# Patient Record
Sex: FEMALE | Race: BLACK OR AFRICAN AMERICAN | NOT HISPANIC OR LATINO | Employment: OTHER | URBAN - METROPOLITAN AREA
[De-identification: names, ages, dates, MRNs, and addresses within clinical notes are randomized per-mention and may not be internally consistent; named-entity substitution may affect disease eponyms.]

---

## 2017-01-04 ENCOUNTER — OFFICE VISIT (OUTPATIENT)
Dept: NEUROLOGY | Facility: CLINIC | Age: 72
End: 2017-01-04
Payer: MEDICARE

## 2017-01-04 ENCOUNTER — INFUSION (OUTPATIENT)
Dept: INFUSION THERAPY | Facility: HOSPITAL | Age: 72
End: 2017-01-04
Attending: FAMILY MEDICINE
Payer: MEDICARE

## 2017-01-04 ENCOUNTER — TELEPHONE (OUTPATIENT)
Dept: NEUROLOGY | Facility: CLINIC | Age: 72
End: 2017-01-04

## 2017-01-04 VITALS
DIASTOLIC BLOOD PRESSURE: 78 MMHG | SYSTOLIC BLOOD PRESSURE: 117 MMHG | BODY MASS INDEX: 35.31 KG/M2 | WEIGHT: 180.75 LBS | HEART RATE: 78 BPM

## 2017-01-04 DIAGNOSIS — E03.9 HYPOTHYROID: Primary | ICD-10-CM

## 2017-01-04 DIAGNOSIS — R51.9 HEADACHE, UNSPECIFIED HEADACHE TYPE: ICD-10-CM

## 2017-01-04 DIAGNOSIS — M47.812 ARTHRITIS OF NECK: ICD-10-CM

## 2017-01-04 LAB
ANION GAP SERPL CALC-SCNC: 9 MMOL/L
BUN SERPL-MCNC: 8 MG/DL
CALCIUM SERPL-MCNC: 9.9 MG/DL
CHLORIDE SERPL-SCNC: 109 MMOL/L
CO2 SERPL-SCNC: 23 MMOL/L
CREAT SERPL-MCNC: 0.9 MG/DL
CRP SERPL-MCNC: 10.9 MG/L
ERYTHROCYTE [SEDIMENTATION RATE] IN BLOOD BY WESTERGREN METHOD: 39 MM/HR
EST. GFR  (AFRICAN AMERICAN): >60 ML/MIN/1.73 M^2
EST. GFR  (NON AFRICAN AMERICAN): >60 ML/MIN/1.73 M^2
GLUCOSE SERPL-MCNC: 162 MG/DL
POTASSIUM SERPL-SCNC: 4.1 MMOL/L
SODIUM SERPL-SCNC: 141 MMOL/L

## 2017-01-04 PROCEDURE — 99213 OFFICE O/P EST LOW 20 MIN: CPT | Mod: S$PBB,GC,, | Performed by: PSYCHIATRY & NEUROLOGY

## 2017-01-04 PROCEDURE — 80048 BASIC METABOLIC PNL TOTAL CA: CPT

## 2017-01-04 PROCEDURE — 25000003 PHARM REV CODE 250: Performed by: FAMILY MEDICINE

## 2017-01-04 PROCEDURE — 36591 DRAW BLOOD OFF VENOUS DEVICE: CPT

## 2017-01-04 PROCEDURE — 86140 C-REACTIVE PROTEIN: CPT

## 2017-01-04 PROCEDURE — 99999 PR PBB SHADOW E&M-EST. PATIENT-LVL IV: CPT | Mod: PBBFAC,GC,, | Performed by: PSYCHIATRY & NEUROLOGY

## 2017-01-04 PROCEDURE — 85651 RBC SED RATE NONAUTOMATED: CPT

## 2017-01-04 RX ORDER — HEPARIN 100 UNIT/ML
500 SYRINGE INTRAVENOUS
Status: COMPLETED | OUTPATIENT
Start: 2017-01-04 | End: 2017-01-04

## 2017-01-04 RX ORDER — SODIUM CHLORIDE 0.9 % (FLUSH) 0.9 %
10 SYRINGE (ML) INJECTION
Status: DISCONTINUED | OUTPATIENT
Start: 2017-01-04 | End: 2017-01-04 | Stop reason: HOSPADM

## 2017-01-04 RX ORDER — BACLOFEN 10 MG/1
10 TABLET ORAL 3 TIMES DAILY
Qty: 90 TABLET | Refills: 4 | Status: SHIPPED | OUTPATIENT
Start: 2017-01-04

## 2017-01-04 RX ADMIN — SODIUM CHLORIDE, PRESERVATIVE FREE 10 ML: 5 INJECTION INTRAVENOUS at 11:01

## 2017-01-04 RX ADMIN — HEPARIN 500 UNITS: 100 SYRINGE at 11:01

## 2017-01-04 NOTE — MR AVS SNAPSHOT
[facility]  [address] [address]  Phone: [phone]  Fax: [phone]                  Olga Smith   2017 8:30 AM   Office Visit    Description:  Female : 1945   Provider:  Ariel Tian II, MD   Department:  [facility] - Neurology           Diagnoses this Visit        Comments    Headache, unspecified headache type         Arthritis of neck                To Do List           Future Appointments        Provider Department Dept Phone    2017 11:00 AM CHAIR 03 [facility] [phone]    2017 8:00 AM Emilie Reynolds MD [facility] [phone]    2017 7:30 PM LAB, SLEEP [facility] [phone]    2017 1:00 PM Thang Marsh MD [facility] [phone]    2017 8:40 AM Bandar Valladares MD [facility] [phone]      Goals (5 Years of Data)              Today    16    Blood Pressure < 140/90   117/78  130/88  116/68       These Medications        Disp Refills Start End    baclofen (LIORESAL) 10 MG tablet 90 tablet 4 2017     Take 1 tablet (10 mg total) by mouth 3 (three) times daily. Take half (5mg)  tablet three times a day - Oral    [place] [facility] [place] [address] [address] Ph #: [phone]         [facility]     [facility] -  Assistance  Registered nurses in the [facility] provide clinical advisement, health education, appointment booking, and other advisory services.  Call for this free service at [phone].             Medications           Message regarding Medications     Verify the changes and/or additions to your medication regime listed below are the same as discussed with your clinician today.  If any of these changes or additions are incorrect, please notify your healthcare provider.             Verify that the  below list of medications is an accurate representation of the medications you are currently taking.  If none reported, the list may be blank. If incorrect, please contact your healthcare provider. Carry this list with you in case of emergency.           Current Medications     albuterol (PROAIR HFA) 90 mcg/actuation inhaler Inhale 2 puffs into the lungs every 6 (six) hours as needed for Wheezing.    alendronate (FOSAMAX) 70 MG tablet Take 1 tablet (70 mg total) by mouth every 7 days.    aspirin (ECOTRIN) 81 MG EC tablet Take 1 tablet (81 mg total) by mouth once daily.    atorvastatin (LIPITOR) 80 MG tablet Take 1 tablet (80 mg total) by mouth once daily.    baclofen (LIORESAL) 10 MG tablet Take 1 tablet (10 mg total) by mouth 3 (three) times daily. Take half (5mg)  tablet three times a day    bimatoprost (LUMIGAN) 0.01 % Drop Place 1 drop into both eyes every evening.    calcium carbonate-vitamin D3 (CALTRATE 600 + D) 600 mg (1,500 mg)-800 unit Chew Take 2 tablets by mouth 2 (two) times daily. Pt. Takes= 1.5 tabs every AM & 1 TAB Every evening.    colesevelam (WELCHOL) 625 mg tablet Take 3 tablets (1,875 mg total) by mouth every evening.    CORTEF 5 mg Tab     cyclobenzaprine (FLEXERIL) 5 MG tablet Take 1 tablet (5 mg total) by mouth 3 (three) times daily as needed for Muscle spasms.    dicyclomine (BENTYL) 10 MG capsule Take 1 capsule (10 mg total) by mouth 4 (four) times daily before meals and nightly.    diphenhydrAMINE (BENADRYL) 25 mg capsule Take 1 each (25 mg total) by mouth nightly as needed for Itching.    fentaNYL (DURAGESIC) 25 mcg/hr Place 1 patch onto the skin every 72 hours.    hydrocortisone (CORTEF) 10 MG Tab Take 1 tablet (10 mg total) by mouth 2 (two) times daily.    isosorbide mononitrate (IMDUR) 30 MG 24 hr tablet Take 1 tablet (30 mg total) by mouth once daily.    lactulose (CEPHULAC) 20 gram Pack Take 1 packet (20 g total) by mouth 3 (three) times daily.    lactulose (CHRONULAC) 10 gram/15 mL  solution     levothyroxine (SYNTHROID) 125 MCG tablet Take 1 tablet (125 mcg total) by mouth once daily.    lidocaine (XYLOCAINE) 5 % Oint ointment Apply topically as needed.    metoprolol tartrate (LOPRESSOR) 50 MG tablet Take 1 tablet (50 mg total) by mouth 2 (two) times daily.    montelukast (SINGULAIR) 10 mg tablet Take 10 mg by mouth every evening.    nitroGLYCERIN (NITROSTAT) 0.4 MG SL tablet Place 1 tablet (0.4 mg total) under the tongue every 5 (five) minutes as needed for Chest pain.    pantoprazole (PROTONIX) 40 MG tablet Take 1 tablet (40 mg total) by mouth 2 (two) times daily.    potassium chloride (KLOR-CON) 10 MEQ TbSR Take 1 tablet (10 mEq total) by mouth once daily.    prasugrel (EFFIENT) 10 mg Tab Take 1 tablet (10 mg total) by mouth once daily.    promethazine (PHENERGAN) 25 MG tablet Take 1 tablet (25 mg total) by mouth every 6 (six) hours as needed for Nausea.    psyllium husk, with sugar, (METAMUCIL, WITH SUGAR,) 3.4 gram/12 gram Powd Take 3.4 g by mouth 3 (three) times daily as needed. Take 1 TSP with 8 oz water, up to 3x / day. increase to 2 TSP to desired regularity    ranolazine (RANEXA) 1,000 mg Tb12 Take 1 tablet (1,000 mg total) by mouth 2 (two) times daily.    sertraline (ZOLOFT) 50 MG tablet Take 1 tablet (50 mg total) by mouth once daily.    verapamil (CALAN-SR) 180 MG CR tablet Take 1 tablet (180 mg total) by mouth every evening.    fluticasone-salmeterol 100-50 mcg/dose (ADVAIR DISKUS) 100-50 mcg/dose diskus inhaler Inhale 1 puff into the lungs 2 (two) times daily.           Clinical Reference Information           Vital Signs - Last Recorded  Most recent update: 1/4/2017  8:25 AM by Gardenia Lerma MA    BP Pulse Wt BMI       117/78 78 82 kg (180 lb 12.4 oz) 35.31 kg/m2       Blood Pressure          Most Recent Value    BP  117/78      Allergies as of 1/4/2017     Aspirin    Dilaudid [Hydromorphone (Bulk)]    Iodine And Iodide Containing Products    Morphine    Oxycodone     Penicillins    Percocet [Oxycodone-acetaminophen]    Shellfish Containing Products    Valium [Diazepam]    Vicodin [Hydrocodone-acetaminophen]    Codeine    Demerol [Meperidine]      Immunizations Administered on Date of Encounter - 1/4/2017     None      Orders Placed During Today's Visit     Future Labs/Procedures Expected by Expires    C-REACTIVE PROTEIN  1/4/2017 3/5/2018    Sedimentation rate, manual  1/4/2017 3/5/2018

## 2017-01-04 NOTE — PROGRESS NOTES
Patient Name: Olga Smith  MRN: [id number]    CC: Headache     Interval History: (1/4/2016)   Reported no change in HA. Took tramadol 3 doses last week and Fioricet twice last week. Denies any vision changes, dizziness, focal weakness, numbness, history of trauma, seizure or LOC.     Interval History: (11/9/2016)  Complaining HA 2-4 times per week. Occipital region and right side temporal, dull in character, stays 3-5 hours to all day, resolved after taking tramadol or after sleep. She feels nausea at times with the headache but has not vomited. She gets exacerbation of the pain with lights and loud noises. Denies any vision changes, dizziness, focal weakness, numbness, history of trauma, seizure or LOC. Zomig not working. Right now tramadol and fioricet is working little bit.           HPI: Olga Smith is a 71 y.o. 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 She presented to clinic for headache evaluation. She reported that she is suffering from headaches since 1985. Initially it was only in the occipital region and radiating to the right side of the head. She says it feels like someone has punched her in her head and then the pain feels like she is being repeatedly hit. Her headache is getting worse every day and today she reported the pain 6/10 and reported headache everyday and maximum reach to 10/10 more than once every day. Since she was last seen she says that she has been having headaches almost daily. The pain can last all day before it subsides. She feels nausea at times with the headache but has not vomited. She gets exacerbation of the pain with lights and loud noises. Reported some success with Zomig and other times she says that she has tried to take the medication but has felt that it did not help her at all. She has previously been tried on Botox and says that she tried another medication for daily preventative therapy but  does not remember the name. She does feel that the Botox helped somewhat but she has not noticed that it has helped enough for her to want to try the injections again. She says that she does not try to take any OTC preparations anymore because they have not helped her in the past. She has hoarseness of voice and reported that she was hospitalized and underwent tracheostomy complicated with vocal cord injury. Also complaining of mild dysphagia with solids. Denies any vision changes, dizziness, focal weakness, numbness, history of trauma, seizure or LOC.        Previous History:  From (11/5/2015)  She was recently admitted for chest pain that was was due to CAD with LAD stent placed on 10/26/15 by Dr. Valladares. She was consulted to Neurology in the hospital for an episode of AMS where she was not responding. CT head done showed post-surgical changes from craniopharyngioma resection in 2000. EEG showed slowing and focal slowing in the area of prior surgery. She says that she has been convalescing as expected after the procedure. She has been having headaches about once per week and says that they typically respond well to the nasal Zomig. She says that she has not noticed any improvement in her headaches with the use of Topamax. She has tried Elavil, Inderal, Verapamil, and Depakote in the past without any significant improvement in her symptoms. She is unwilling to retry any of these medications.       From (2/17/2016)  Since she was last seen she says that she has been having headaches almost daily. She has had some success with aborting the headaches with Zomig and other times she says that she has tried to take the medication but has felt that it did not help her at all. She says that she does not try to take any OTC preparations anymore because they have not helped her in the past. She says that has been having problems with IBS and has been having frequent diarrhea that has not been able to be treated. She has seen  her PCP and she has been seen in the ED for evaluation as well. She says that she has been given medications to try but has not been able to find any relief from the medication. She says that she has been unable to tolerate any food or liquids without having diarrhea. She has an appointment with a GI doctor but she says that the appointment has been rescheduled on several occasions. She has tried to get benzocaine topical as she says that she has rectal pain and fissures that makes going to the bathroom difficult.        ROS:    Constitutional: Negative for weight loss.   HENT: Negative for hearing loss.   Eyes: Negative for blurred vision and double vision.   Respiratory: Negative for shortness of breath and stridor.   Cardiovascular: Negative for chest pain and palpitations.   Gastrointestinal: Negative for nausea, vomiting and constipation.   Genitourinary: Negative for frequency. Negative for urgency.   Musculoskeletal: Negative for falls.   Skin: Negative for rash.   Neurological: Negative for dizziness and tremors. Negative for focal weakness and seizures.   Endo/Heme/Allergies: Does not bruise/bleed easily.   Psychiatric/Behavioral: Negative for depression and hallucinations. The patient is not nervous/anxious.      Past Medical History  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    Encounter for blood transfusion     Extravasation injury of IV catheter site with other complication 2008     Pain remains to left forearm, no IV sticks    Fibromyalgia     Glaucoma     Hypertension     Irritable bowel syndrome (IBS)     Osteoporosis 8/7/2015    Pacemaker     Thyroid disease     Tumor of optic nerve        Medications    Current Outpatient Prescriptions:     albuterol (PROAIR HFA) 90 mcg/actuation inhaler, Inhale 2 puffs into the lungs every 6 (six) hours as needed  for Wheezing., Disp: 4 Inhaler, Rfl: 5    alendronate (FOSAMAX) 70 MG tablet, Take 1 tablet (70 mg total) by mouth every 7 days., Disp: 12 tablet, Rfl: 3    aspirin (ECOTRIN) 81 MG EC tablet, Take 1 tablet (81 mg total) by mouth once daily., Disp: , Rfl: 0    atorvastatin (LIPITOR) 80 MG tablet, Take 1 tablet (80 mg total) by mouth once daily., Disp: 90 tablet, Rfl: 0    baclofen (LIORESAL) 10 MG tablet, Take 1 tablet (10 mg total) by mouth 3 (three) times daily. Take half (5mg)  tablet three times a day, Disp: 90 tablet, Rfl: 4    bimatoprost (LUMIGAN) 0.01 % Drop, Place 1 drop into both eyes every evening., Disp: 7.5 mL, Rfl: 2    calcium carbonate-vitamin D3 (CALTRATE 600 + D) 600 mg (1,500 mg)-800 unit Chew, Take 2 tablets by mouth 2 (two) times daily. Pt. Takes= 1.5 tabs every AM & 1 TAB Every evening., Disp: , Rfl:     colesevelam (WELCHOL) 625 mg tablet, Take 3 tablets (1,875 mg total) by mouth every evening., Disp: 270 tablet, Rfl: 3    CORTEF 5 mg Tab, , Disp: , Rfl:     cyclobenzaprine (FLEXERIL) 5 MG tablet, Take 1 tablet (5 mg total) by mouth 3 (three) times daily as needed for Muscle spasms., Disp: 90 tablet, Rfl: 1    dicyclomine (BENTYL) 10 MG capsule, Take 1 capsule (10 mg total) by mouth 4 (four) times daily before meals and nightly., Disp: 120 capsule, Rfl: 3    diphenhydrAMINE (BENADRYL) 25 mg capsule, Take 1 each (25 mg total) by mouth nightly as needed for Itching., Disp: 30 capsule, Rfl: 5    fentaNYL (DURAGESIC) 25 mcg/hr, Place 1 patch onto the skin every 72 hours. (Patient taking differently: Place 1 patch onto the skin every 72 hours. As Needed.), Disp: 5 patch, Rfl: 0    hydrocortisone (CORTEF) 10 MG Tab, Take 1 tablet (10 mg total) by mouth 2 (two) times daily., Disp: 60 tablet, Rfl: 6    isosorbide mononitrate (IMDUR) 30 MG 24 hr tablet, Take 1 tablet (30 mg total) by mouth once daily., Disp: 30 tablet, Rfl: 11    lactulose (CEPHULAC) 20 gram Pack, Take 1 packet (20 g  total) by mouth 3 (three) times daily., Disp: 30 packet, Rfl: 0    lactulose (CHRONULAC) 10 gram/15 mL solution, , Disp: , Rfl:     levothyroxine (SYNTHROID) 125 MCG tablet, Take 1 tablet (125 mcg total) by mouth once daily., Disp: 90 tablet, Rfl: 0    lidocaine (XYLOCAINE) 5 % Oint ointment, Apply topically as needed., Disp: 50 g, Rfl: 1    metoprolol tartrate (LOPRESSOR) 50 MG tablet, Take 1 tablet (50 mg total) by mouth 2 (two) times daily., Disp: 60 tablet, Rfl: 11    montelukast (SINGULAIR) 10 mg tablet, Take 10 mg by mouth every evening., Disp: , Rfl:     nitroGLYCERIN (NITROSTAT) 0.4 MG SL tablet, Place 1 tablet (0.4 mg total) under the tongue every 5 (five) minutes as needed for Chest pain., Disp: 10 tablet, Rfl: 0    pantoprazole (PROTONIX) 40 MG tablet, Take 1 tablet (40 mg total) by mouth 2 (two) times daily., Disp: 30 tablet, Rfl: 11    potassium chloride (KLOR-CON) 10 MEQ TbSR, Take 1 tablet (10 mEq total) by mouth once daily., Disp: 30 tablet, Rfl: 5    prasugrel (EFFIENT) 10 mg Tab, Take 1 tablet (10 mg total) by mouth once daily., Disp: 90 tablet, Rfl: 3    promethazine (PHENERGAN) 25 MG tablet, Take 1 tablet (25 mg total) by mouth every 6 (six) hours as needed for Nausea., Disp: 30 tablet, Rfl: 1    psyllium husk, with sugar, (METAMUCIL, WITH SUGAR,) 3.4 gram/12 gram Powd, Take 3.4 g by mouth 3 (three) times daily as needed. Take 1 TSP with 8 oz water, up to 3x / day. increase to 2 TSP to desired regularity, Disp: 862 g, Rfl: 3    ranolazine (RANEXA) 1,000 mg Tb12, Take 1 tablet (1,000 mg total) by mouth 2 (two) times daily., Disp: 60 tablet, Rfl: 11    sertraline (ZOLOFT) 50 MG tablet, Take 1 tablet (50 mg total) by mouth once daily., Disp: 30 tablet, Rfl: 5    verapamil (CALAN-SR) 180 MG CR tablet, Take 1 tablet (180 mg total) by mouth every evening., Disp: 30 tablet, Rfl: 0    fluticasone-salmeterol 100-50 mcg/dose (ADVAIR DISKUS) 100-50 mcg/dose diskus inhaler, Inhale 1 puff into  the lungs 2 (two) times daily. (Patient taking differently: Inhale 1 puff into the lungs 2 (two) times daily. prn), Disp: 60 each, Rfl: 3    Allergies  Review of patient's allergies indicates:   Allergen Reactions    Aspirin Nausea Only    Dilaudid [hydromorphone (bulk)] Itching     Rash , swelling of throat.    Iodine and iodide containing products Other (See Comments)     Closing of Throat. No Shellfish of any kind. Can't eat certain kinds of Regular Fish, ie, can't eat: Tuna, Swordfish, Tar Heel=} these are the major fishes she can't eat.    Morphine Itching, Nausea And Vomiting and Hallucinations    Oxycodone Itching and Swelling     Swelling of Throat.    Penicillins Itching and Rash    Percocet [oxycodone-acetaminophen] Itching and Swelling     Cannot tolerate this medication. Swelling of Throat.    Shellfish containing products Other (See Comments)     Closing of Throat. No Shellfish of any kind. Can't eat certain kinds of Regular Fish, ie, can't eat: Tuna, Swordfish, Tar Heel=} these are the major fishes she can't eat.      Valium [diazepam] Hives and Itching    Vicodin [hydrocodone-acetaminophen] Itching, Nausea Only and Rash    Codeine Itching, Nausea Only and Rash    Demerol [meperidine] Itching and Rash       Social History  Social History     Social History    Marital status:      Spouse name: N/A    Number of children: N/A    Years of education: N/A     Occupational History    Not on file.     Social History Main Topics    Smoking status: Former Smoker     Packs/day: 1.00     Years: 25.00     Types: Cigarettes     Quit date: 11/9/1985    Smokeless tobacco: Never Used    Alcohol use No    Drug use: No    Sexual activity: Yes     Partners: Male     Other Topics Concern    Not on file     Social History Narrative    No narrative on file       Family History  Family History   Problem Relation Age of Onset    Cataracts Brother     Glaucoma Brother     Glaucoma Mother     Lung  cancer Mother     Coronary artery disease Mother     Coronary artery disease Father     Cataracts Sister     Glaucoma Sister     No Known Problems Maternal Aunt     No Known Problems Maternal Uncle     No Known Problems Paternal Aunt     No Known Problems Paternal Uncle     No Known Problems Maternal Grandmother     No Known Problems Maternal Grandfather     No Known Problems Paternal Grandmother     No Known Problems Paternal Grandfather     Colon cancer Neg Hx     Colon polyps Neg Hx     Amblyopia Neg Hx     Blindness Neg Hx     Cancer Neg Hx     Diabetes Neg Hx     Hypertension Neg Hx     Macular degeneration Neg Hx     Retinal detachment Neg Hx     Strabismus Neg Hx     Stroke Neg Hx     Thyroid disease Neg Hx        Physical Exam  Visit Vitals    /78    Pulse 78    Wt 82 kg (180 lb 12.4 oz)    BMI 35.31 kg/m2       General appearance: Well-developed, well-groomed.     Neurologic Exam: The patient is awake, alert and oriented. Language is fluent. Recent and remote memory are normal. Attention span and concentration are normal. Fund of knowledge is appropriate.     Cranial nerves: pupils are round and reactive to light and accommodation. Visual fields are full to confrontation. Fundoscopic exam reveals sharp discs bilaterally, with good venous pulsations. Ocular motility is full in all cardinal positions of gaze. Facial sensation is normal to pinprick and light touch. Corneal reflexes are present bilaterally. Facial activation is symmetric. Hearing is normal bilaterally. Palate elevates symmetrically and gag reflex is intact bilaterally. Shoulder elevation is symmetric and full strength bilaterally. Tongue is midline and neck rotation strength is normal bilaterally. Neck range of motion is normal.     Motor examination of all extremities demonstrates normal bulk and tone in all four limbs. There are no atrophy or fasciculations. Strength is 5/5 in the upper and lower extremities  bilaterally without pronator drift.     Sensory examination is normal to pinprick, vibration and proprioception in the upper and lower extremities bilaterally. Romberg is negative.    Deep tendon reflexes are 2+ and symmetric in the upper and lower extremities bilaterally. Toes are mute bilaterally.     Gait: Normal gait.    Coordination: Finger to nose and heel to shin testing is normal in both upper and lower extremities. Rapid alternating movements are normal in both upper and lower extremities.     General exam  Cardiovascular: regular rate and rhythm with no murmurs, rubs or gallops. There are no carotid or vertebral artery bruits. Pulses in both upper and lower extremities are symmetric. There is no peripheral edema.   Head and neck: no cervical lymphadenopathy. Right temporal and occipital region is tender more than left side. Tender areas in neck B/L       Lab and Test Results    WBC   Date Value Ref Range Status   11/11/2016 12.35 3.90 - 12.70 K/uL Final   11/10/2016 13.84 (H) 3.90 - 12.70 K/uL Final   11/01/2016 9.63 3.90 - 12.70 K/uL Final     Hemoglobin   Date Value Ref Range Status   11/11/2016 9.7 (L) 12.0 - 16.0 g/dL Final   11/10/2016 10.2 (L) 12.0 - 16.0 g/dL Final   11/01/2016 11.6 (L) 12.0 - 16.0 g/dL Final     Hematocrit   Date Value Ref Range Status   11/11/2016 31.1 (L) 37.0 - 48.5 % Final   11/10/2016 32.3 (L) 37.0 - 48.5 % Final   11/01/2016 36.6 (L) 37.0 - 48.5 % Final     Platelets   Date Value Ref Range Status   11/11/2016 332 150 - 350 K/uL Final   11/10/2016 342 150 - 350 K/uL Final   11/01/2016 364 (H) 150 - 350 K/uL Final     Glucose   Date Value Ref Range Status   12/06/2016 103 70 - 110 mg/dL Final   11/10/2016 117 (H) 70 - 110 mg/dL Final   11/09/2016 106 70 - 110 mg/dL Final     Sodium   Date Value Ref Range Status   12/06/2016 142 136 - 145 mmol/L Final   11/10/2016 144 136 - 145 mmol/L Final   11/09/2016 143 136 - 145 mmol/L Final     Potassium   Date Value Ref Range Status    12/06/2016 3.9 3.5 - 5.1 mmol/L Final   11/10/2016 4.3 3.5 - 5.1 mmol/L Final   11/09/2016 4.0 3.5 - 5.1 mmol/L Final     Chloride   Date Value Ref Range Status   12/06/2016 109 95 - 110 mmol/L Final   11/10/2016 110 95 - 110 mmol/L Final   11/09/2016 109 95 - 110 mmol/L Final     CO2   Date Value Ref Range Status   12/06/2016 25 23 - 29 mmol/L Final   11/10/2016 25 23 - 29 mmol/L Final   11/09/2016 27 23 - 29 mmol/L Final     BUN, Bld   Date Value Ref Range Status   12/06/2016 13 8 - 23 mg/dL Final   11/10/2016 9 8 - 23 mg/dL Final   11/09/2016 9 8 - 23 mg/dL Final     Creatinine   Date Value Ref Range Status   12/06/2016 0.9 0.5 - 1.4 mg/dL Final   11/10/2016 0.8 0.5 - 1.4 mg/dL Final   11/09/2016 0.8 0.5 - 1.4 mg/dL Final     Calcium   Date Value Ref Range Status   12/06/2016 9.7 8.7 - 10.5 mg/dL Final   11/10/2016 10.0 8.7 - 10.5 mg/dL Final   11/09/2016 9.7 8.7 - 10.5 mg/dL Final     Magnesium   Date Value Ref Range Status   08/27/2016 2.0 1.6 - 2.6 mg/dL Final   04/18/2016 1.8 1.6 - 2.6 mg/dL Final   04/08/2016 1.8 1.6 - 2.6 mg/dL Final     Phosphorus   Date Value Ref Range Status   10/26/2015 3.4 2.7 - 4.5 mg/dL Final     Alkaline Phosphatase   Date Value Ref Range Status   11/10/2016 72 55 - 135 U/L Final   11/01/2016 92 55 - 135 U/L Final   10/18/2016 74 55 - 135 U/L Final     ALT   Date Value Ref Range Status   11/10/2016 10 10 - 44 U/L Final   11/01/2016 10 10 - 44 U/L Final   10/18/2016 9 (L) 10 - 44 U/L Final     AST   Date Value Ref Range Status   11/10/2016 10 10 - 40 U/L Final   11/01/2016 14 10 - 40 U/L Final   10/18/2016 16 10 - 40 U/L Final       Images:     CT Brain: August:  1. No evidence of acute intracranial hemorrhage.  2. Stable appearing remote postoperative change as detailed above.    Medication tried:  -- Nasal Zomig  -- Topamax   -- Elavil  -- Inderal  -- Verapamil  -- Depakote   -- Botox   -- Imitrex   -- Flexeril   -- Gabapentin     Assessment     Olga Smith is a 71 y.o.  female with past medical history of Craniopharyngioma, fibromyalgia and CAD S/P stenting presented for headache.   -- HA is less likely due to Craniopharyngioma considering small size and no compression   -- Likely ocipital neuralgia and arthritis of neck.     Plan:     -- Continue on Zoloft 50 mg daily   -- She was not taking baclofen 5 mg TID because of some pharmacy issue. She will start taking from today. Prescription handed over to her.   -- Avoid over the counter medication to decrease the risk of medication overuse headache  -- Follow ESR and CRP   -- Case discussed with Dr Yates   -- Follow up on May 24th at 10:30 am.       Jaylan George MD  Neurology Resident   [facility] [address] [place] [address]  Pager: [phone]

## 2017-01-04 NOTE — TELEPHONE ENCOUNTER
----- Message from Parris Acosta sent at 1/4/2017 12:05 PM CST -----  Contact: [facility]  Pharmacy has questions about the script they received for Baclofen 10mg    Contact number [phone]  Thanks

## 2017-01-06 ENCOUNTER — TELEPHONE (OUTPATIENT)
Dept: ENDOSCOPY | Facility: HOSPITAL | Age: 72
End: 2017-01-06

## 2017-01-06 NOTE — TELEPHONE ENCOUNTER
Patient has an order for an EGD.  Is it ok to hold Effient 5 days prior to procedure?  Please advise.  Thank you.  Mary Anne

## 2017-01-06 NOTE — TELEPHONE ENCOUNTER
Patient called to schedule EGD..  While reviewing PMH and medications, was noted that patient is on Effient. She is prescribed Effient from her Cardiologist-Dr JESSICA Valladares.  Informed patient that we need approval to hold Effient from physician prior to scheduling procedure. After receiving information back, I will call to schedule procedure.  Stated understanding. No further questions at this time.

## 2017-01-09 ENCOUNTER — HOSPITAL ENCOUNTER (OUTPATIENT)
Dept: SLEEP MEDICINE | Facility: HOSPITAL | Age: 72
Discharge: HOME | End: 2017-01-09
Attending: FAMILY MEDICINE
Payer: MEDICARE

## 2017-01-09 ENCOUNTER — INITIAL CONSULT (OUTPATIENT)
Dept: OPHTHALMOLOGY | Facility: CLINIC | Age: 72
End: 2017-01-09
Payer: MEDICARE

## 2017-01-09 DIAGNOSIS — D44.4 CRANIOPHARYNGIOMA: ICD-10-CM

## 2017-01-09 DIAGNOSIS — H40.1134 PRIMARY OPEN ANGLE GLAUCOMA OF BOTH EYES, INDETERMINATE STAGE: Primary | ICD-10-CM

## 2017-01-09 DIAGNOSIS — H53.40 VISUAL FIELD LOSS: ICD-10-CM

## 2017-01-09 DIAGNOSIS — H25.13 NUCLEAR SCLEROTIC CATARACT OF BOTH EYES: ICD-10-CM

## 2017-01-09 DIAGNOSIS — H25.13 SENILE NUCLEAR SCLEROSIS, BILATERAL: ICD-10-CM

## 2017-01-09 DIAGNOSIS — G47.33 OSA (OBSTRUCTIVE SLEEP APNEA): ICD-10-CM

## 2017-01-09 PROCEDURE — 92014 COMPRE OPH EXAM EST PT 1/>: CPT | Mod: S$PBB,,, | Performed by: OPHTHALMOLOGY

## 2017-01-09 PROCEDURE — 99999 PR PBB SHADOW E&M-EST. PATIENT-LVL II: CPT | Mod: PBBFAC,,, | Performed by: OPHTHALMOLOGY

## 2017-01-09 PROCEDURE — 95811 POLYSOM 6/>YRS CPAP 4/> PARM: CPT | Mod: 26,,, | Performed by: INTERNAL MEDICINE

## 2017-01-09 PROCEDURE — 92250 FUNDUS PHOTOGRAPHY W/I&R: CPT | Mod: 26,S$PBB,, | Performed by: OPHTHALMOLOGY

## 2017-01-09 PROCEDURE — 95811 PR POLYSOMNOGRAPHY W/CPAP: ICD-10-PCS | Mod: 26,,, | Performed by: INTERNAL MEDICINE

## 2017-01-09 NOTE — PROGRESS NOTES
HPI     Glaucoma    Additional comments: glaucoma eval           Comments   Pt here for glaucoma evaluation per Dr. Gil. Pt has hx of advanced glaucoma   OD>OS and is inconsistent with drop therapy per notes. Pt has been using   drops in both eyes consistently, states that she was started on drops in   2007 and was on and off of them from 1555-9753. She notes trouble with her   preipheral vision of late which she attributes to a brain tumor   (craniopharyngioma) that was found in 2000 ([place]).    1. POAG OD>OS  2. NS OU    MEDS:  Lumigan QHS OU       Last edited by Cat Solis MD on 1/9/2017  9:16 AM. (History)            Assessment /Plan     For exam results, see Encounter Report.    Primary open angle glaucoma of both eyes, indeterminate stage    Nuclear sclerotic cataract of both eyes    Craniopharyngioma    Visual field loss    Senile nuclear sclerosis, bilateral      NO PHOTO FILE - all in OIS and EPIC   - glaucoma suspect - but suspect visual field loss 2/2 brain tumor and NOT glaucoma   -referred over from [facility] by         Glaucoma (type and duration)    POAG OD>OS // ??  LTG    First HVF  12/23/2016   First photos   1/8/2017- pending   Treatment / Drops started 2007 - but has not used regularly            Family history    None        Glaucoma meds    Latanoprost QHS OU          H/O adverse rxn to glaucoma drops    None        LASERS    None        GLAUCOMA SURGERIES    None        OTHER EYE SURGERIES    None        CDR    0.7 thin sup / 0.7         Tbase    15-16/15-16          Tmax    16/16            Ttarget    ??              HVF    2 test 2015 to  2016 - superior and inferior arcuate defect od // nonspecific defects os   ?? IF VF LOSS 2/2 CRANIOPHARYNGIOMA - INOPERABLE         Gonio    +3 OU        CCT    497/507        OCT    1 test 2016 to 2016 - RNFL - 44 (diffuse thinning) od // Nasal and temporal borderline os        HRT  1 test 2015 to 2017 - MR 0.191- od // 0.167 os         Disc photos   1/9/ 2017    - Ttoday    13/11  -T today - photos / gonio - initial consult       2 craniopharyngioma    -in-operable    ++ VF loss - may likely be 2/2 tumor and not the glaucoma     3. NS ou     4. Chronic pain / headaches    ?? 2/2 to #2     PLAN   csm   IOP low   VF loss likely 2/2 tumor and not glaucomatous - healthy looking rims - but severe VF loss     F/U 4 months - try HVF 24-2 stim V od and 24-2 ss os // OCT     I have seen and personally examined the patient.  I agree with the findings, assessment and plan of the resident and/or fellow.     Emilie Reynolds MD

## 2017-01-09 NOTE — LETTER
January 9, 2017      Deb Gil, OD  [address]  [facility] [address]           [facility]  [address]  [facility] [address]  Phone: [phone]  Fax: [phone]          Patient: Olga Smith   MR Number: [id number]   YOB: 1945   Date of Visit: 1/9/2017       Dear Dr. Deb Gil:    Thank you for referring Olga Smith to me for evaluation. Attached you will find relevant portions of my assessment and plan of care.    If you have questions, please do not hesitate to call me. I look forward to following Olga Smith along with you.    Sincerely,    Emilie Reynolds MD    Enclosure  CC:  No Recipients    If you would like to receive this communication electronically, please contact [email or web address] or [phone] to request more information on [employer] Link access.    For providers and/or their staff who would like to refer a patient to [facility], please contact us through our one-stop-shop provider referral line, [facility], at [phone].    If you feel you have received this communication in error or would no longer like to receive these types of communications, please e-mail [email or web address]

## 2017-01-12 ENCOUNTER — OFFICE VISIT (OUTPATIENT)
Dept: GASTROENTEROLOGY | Facility: CLINIC | Age: 72
End: 2017-01-12
Payer: MEDICARE

## 2017-01-12 VITALS
BODY MASS INDEX: 35.37 KG/M2 | WEIGHT: 180.13 LBS | HEIGHT: 60 IN | SYSTOLIC BLOOD PRESSURE: 106 MMHG | DIASTOLIC BLOOD PRESSURE: 66 MMHG | HEART RATE: 62 BPM

## 2017-01-12 DIAGNOSIS — K21.9 GASTROESOPHAGEAL REFLUX DISEASE, ESOPHAGITIS PRESENCE NOT SPECIFIED: ICD-10-CM

## 2017-01-12 DIAGNOSIS — Z90.49 HISTORY OF PARTIAL COLECTOMY: Chronic | ICD-10-CM

## 2017-01-12 DIAGNOSIS — K58.2 IRRITABLE BOWEL SYNDROME WITH BOTH CONSTIPATION AND DIARRHEA: Primary | ICD-10-CM

## 2017-01-12 PROCEDURE — 99213 OFFICE O/P EST LOW 20 MIN: CPT | Mod: S$PBB,GC,, | Performed by: INTERNAL MEDICINE

## 2017-01-12 PROCEDURE — 99999 PR PBB SHADOW E&M-EST. PATIENT-LVL III: CPT | Mod: PBBFAC,GC,, | Performed by: INTERNAL MEDICINE

## 2017-01-12 PROCEDURE — 99213 OFFICE O/P EST LOW 20 MIN: CPT | Mod: PBBFAC | Performed by: INTERNAL MEDICINE

## 2017-01-12 NOTE — MR AVS SNAPSHOT
[facility]enter[facility]  [address]  [place] [address]  Phone: [phone]  Fax: [phone]                  Olga Smith   2017 1:00 PM   Office Visit    Description:  Female : 1945   Provider:  Thang Marsh MD   Department:  [facility] - Gastroenterology           Reason for Visit     Follow-up           Diagnoses this Visit        Comments    Irritable bowel syndrome with both constipation and diarrhea    -  Primary            To Do List           Future Appointments        Provider Department Dept Phone    2017 10:30 AM CHAIR 01 [facility] [phone]    2017 8:40 AM Bandar Valladares MD [facility] - Cardiology [phone]    2017 10:15 AM PERIMETRY, [facility] [phone]    2017 10:45 AM Emilie Reynolds MD [facility] Ophthalmology [phone]      Your Future Surgeries/Procedures     2017   Surgery with Emanuel Ozuna MD   [facility]    [address] [facility] [address] [phone]              Goals (5 Years of Data)              Today    17    Blood Pressure < 140/90   106/66  117/78  130/88      [facility]     [facility] Care Line - / Assistance  Registered nurses in the [facility] provide clinical advisement, health education, appointment booking, and other advisory services.  Call for this free service at [phone].             Medications           Message regarding Medications     Verify the changes and/or additions to your medication regime listed below are the same as discussed with your clinician today.  If any of these changes or additions are incorrect, please notify your healthcare provider.             Verify that the below list of medications is an accurate representation of the medications you are currently taking.  If none reported, the list may be  blank. If incorrect, please contact your healthcare provider. Carry this list with you in case of emergency.           Current Medications     albuterol (PROAIR HFA) 90 mcg/actuation inhaler Inhale 2 puffs into the lungs every 6 (six) hours as needed for Wheezing.    alendronate (FOSAMAX) 70 MG tablet Take 1 tablet (70 mg total) by mouth every 7 days.    aspirin (ECOTRIN) 81 MG EC tablet Take 1 tablet (81 mg total) by mouth once daily.    atorvastatin (LIPITOR) 80 MG tablet Take 1 tablet (80 mg total) by mouth once daily.    baclofen (LIORESAL) 10 MG tablet Take 1 tablet (10 mg total) by mouth 3 (three) times daily. Take half (5mg)  tablet three times a day    bimatoprost (LUMIGAN) 0.01 % Drop Place 1 drop into both eyes every evening.    calcium carbonate-vitamin D3 (CALTRATE 600 + D) 600 mg (1,500 mg)-800 unit Chew Take 2 tablets by mouth 2 (two) times daily. Pt. Takes= 1.5 tabs every AM & 1 TAB Every evening.    colesevelam (WELCHOL) 625 mg tablet Take 3 tablets (1,875 mg total) by mouth every evening.    cyclobenzaprine (FLEXERIL) 5 MG tablet Take 1 tablet (5 mg total) by mouth 3 (three) times daily as needed for Muscle spasms.    dicyclomine (BENTYL) 10 MG capsule Take 1 capsule (10 mg total) by mouth 4 (four) times daily before meals and nightly.    diphenhydrAMINE (BENADRYL) 25 mg capsule Take 1 each (25 mg total) by mouth nightly as needed for Itching.    fentaNYL (DURAGESIC) 25 mcg/hr Place 1 patch onto the skin every 72 hours.    hydrocortisone (CORTEF) 10 MG Tab Take 1 tablet (10 mg total) by mouth 2 (two) times daily.    isosorbide mononitrate (IMDUR) 30 MG 24 hr tablet Take 1 tablet (30 mg total) by mouth once daily.    levothyroxine (SYNTHROID) 125 MCG tablet Take 1 tablet (125 mcg total) by mouth once daily.    lidocaine (XYLOCAINE) 5 % Oint ointment Apply topically as needed.    metoprolol tartrate (LOPRESSOR) 50 MG tablet Take 1 tablet (50 mg total) by mouth 2 (two) times daily.    montelukast  (SINGULAIR) 10 mg tablet Take 10 mg by mouth every evening.    pantoprazole (PROTONIX) 40 MG tablet Take 1 tablet (40 mg total) by mouth 2 (two) times daily.    potassium chloride (KLOR-CON) 10 MEQ TbSR Take 1 tablet (10 mEq total) by mouth once daily.    prasugrel (EFFIENT) 10 mg Tab Take 1 tablet (10 mg total) by mouth once daily.    promethazine (PHENERGAN) 25 MG tablet Take 1 tablet (25 mg total) by mouth every 6 (six) hours as needed for Nausea.    ranolazine (RANEXA) 1,000 mg Tb12 Take 1 tablet (1,000 mg total) by mouth 2 (two) times daily.    sertraline (ZOLOFT) 50 MG tablet Take 1 tablet (50 mg total) by mouth once daily.    verapamil (CALAN-SR) 180 MG CR tablet Take 1 tablet (180 mg total) by mouth every evening.    lactulose (CHRONULAC) 10 gram/15 mL solution Take 20 g by mouth 3 (three) times daily.     nitroGLYCERIN (NITROSTAT) 0.4 MG SL tablet Place 1 tablet (0.4 mg total) under the tongue every 5 (five) minutes as needed for Chest pain.    psyllium husk, with sugar, (METAMUCIL, WITH SUGAR,) 3.4 gram/12 gram Powd Take 3.4 g by mouth 3 (three) times daily as needed. Take 1 TSP with 8 oz water, up to 3x / day. increase to 2 TSP to desired regularity           Clinical Reference Information           Vital Signs - Last Recorded  Most recent update: 1/12/2017  1:19 PM by Dakota Wallace MA    BP Pulse Ht Wt BMI    106/66 62 5' (1.524 m) 81.7 kg (180 lb 1.9 oz) 35.18 kg/m2      Blood Pressure          Most Recent Value    BP  106/66      Allergies as of 1/12/2017     Aspirin    Dilaudid [Hydromorphone (Bulk)]    Iodine And Iodide Containing Products    Morphine    Oxycodone    Penicillins    Percocet [Oxycodone-acetaminophen]    Shellfish Containing Products    Valium [Diazepam]    Vicodin [Hydrocodone-acetaminophen]    Codeine    Demerol [Meperidine]      Immunizations Administered on Date of Encounter - 1/12/2017     None      Orders Placed During Today's Visit     Future Labs/Procedures Expected by  Expires    Clostridium difficile EIA  1/12/2017 3/13/2018    Fecal fat, qualitative  1/12/2017 3/13/2018    Giardia / Cryptosporidum, EIA  1/12/2017 3/13/2018

## 2017-01-12 NOTE — PROGRESS NOTES
Gastroenterology Clinic    Reason for visit: The primary encounter diagnosis was Irritable bowel syndrome with both constipation and diarrhea. Diagnoses of History of partial colectomy and Gastroesophageal reflux disease, esophagitis presence not specified were also pertinent to this visit.  Referring provider/PCP: Valencia Palencia MD    HPI:  Olga Smith is a 71 y.o. female seen in follow up. Hx of prior ischemic colitis s/p partial colectomy, s/p cherise, GERD, prior dx of IBS-D.  She was seen by myself approx 3 months ago and by Dr. Ozuna 10 months ago.  Today she states her diarrhea has somewhat improved, now approx 3x /day. She rarely has nocturnal stool, but does get up to go urinate and sometimes has stool with it. She is concerned now that over the past week her stool is looked oily. She states she woonders if it is infected given the gray appearance. No systemic symptoms of F/C, rash, emesis, fatigue.   She had episode approx 1 month ago when she got severe constipation and was 'locked up'. Visited her PCP and was given lactulose and castor oil.  Difficult situation as patient seems to be very sensitive and alternates bowel habits.   She still taking welchol 3 tabs nightly , taking bentyl with some relief. Also taking ducosate PRN.   Does not like fiber as 'makes her go' too much.   She does not drink carbonated beverages. No artificial sweeters. Drinks soy milk, although does not avoid dairy entirely. Does eat cheeses.        Note per prior visit: 11/3/16  Olga Smith is a 71 y.o. female hx of ischemic colitis s/p partial colectomy, s/p cherise, s/p hysterectomy, GERD, IBS -D who presents as clinic follow up for continued abd pain and diarrhea. Pt is new to me. Has been seen by [facility] Carrillo at [facility] prior.  Pt has had abdominal issues 'all my life'. She has alternating diarrhea with constipation. She recently has had frequent diarrhea, described as immediate defecation after eating. No  steatorrhea. Having nocturnal stools, approx 2 - 3 x /night. Having stools approx 6 x /day on bad days. Has constipation after taking immodium or lomotil. Has tried questran. Was previously prescribed rifaximin but was not able to obtain this medication. Has been on fiber in past with good benefit ,but stopped this. Takes bentyl with decent relief of pain and some improvement in diarrhea.           Past Endo Hx:  Colon 9/2016:  Impression:          - Non-bleeding internal hemorrhoids.                       - Two 2 to 3 mm polyps in the sigmoid colon.                        Resected and retrieved.                       - Mild diverticulosis.                       - A few ulcers at the colonic anastomosis.                       - Biopsies were taken with a cold forceps from the                        entire colon for evaluation of microscopic colitis.  Recommendation:      - Await pathology results.                       - Repeat colonoscopy in 5 years for surveillance                        based on pathology results.                       - Return to GI clinic in 1 month.     PATH  1. COLON BIOPSY, SIGMOID- GRANULATION TISSUE SUGGESTIVE OF THE BASE OF AN ULCER AND  REACTIVE GLANDULAR HYPERPLASIA (HYPERPLASTIC POLYP).  2. COLON BIOPSIES, RANDOM NO SIGNIFICANT HISTOPATHOLOGIC ABNORMALITY. THERE IS NO  EVIDENCE OF MICROSCOPIC COLITIS.     EGD 2014:  No report available      Review of Systems:  Constitutional: no fever, chills or change in weight   Respiratory: no cough or shortness of breath   Cardiovascular: no chest pain or palpitations   Gastrointestinal: as per HPI  Hematologic/Lymphatic: no easy bruising or lymphadenopathy   Musculoskeletal: no arthralgias or myalgias   Neurological: no change in mental status  Behavioral/Psych: no change in mood    Current Outpatient Prescriptions on File Prior to Visit   Medication Sig Dispense Refill    albuterol (PROAIR HFA) 90 mcg/actuation inhaler Inhale 2 puffs into the  lungs every 6 (six) hours as needed for Wheezing. 4 Inhaler 5    alendronate (FOSAMAX) 70 MG tablet Take 1 tablet (70 mg total) by mouth every 7 days. 12 tablet 3    aspirin (ECOTRIN) 81 MG EC tablet Take 1 tablet (81 mg total) by mouth once daily.  0    atorvastatin (LIPITOR) 80 MG tablet Take 1 tablet (80 mg total) by mouth once daily. 90 tablet 0    baclofen (LIORESAL) 10 MG tablet Take 1 tablet (10 mg total) by mouth 3 (three) times daily. Take half (5mg)  tablet three times a day 90 tablet 4    bimatoprost (LUMIGAN) 0.01 % Drop Place 1 drop into both eyes every evening. 7.5 mL 2    calcium carbonate-vitamin D3 (CALTRATE 600 + D) 600 mg (1,500 mg)-800 unit Chew Take 2 tablets by mouth 2 (two) times daily. Pt. Takes= 1.5 tabs every AM & 1 TAB Every evening.      colesevelam (WELCHOL) 625 mg tablet Take 3 tablets (1,875 mg total) by mouth every evening. 270 tablet 3    cyclobenzaprine (FLEXERIL) 5 MG tablet Take 1 tablet (5 mg total) by mouth 3 (three) times daily as needed for Muscle spasms. 90 tablet 1    dicyclomine (BENTYL) 10 MG capsule Take 1 capsule (10 mg total) by mouth 4 (four) times daily before meals and nightly. 120 capsule 3    diphenhydrAMINE (BENADRYL) 25 mg capsule Take 1 each (25 mg total) by mouth nightly as needed for Itching. 30 capsule 5    fentaNYL (DURAGESIC) 25 mcg/hr Place 1 patch onto the skin every 72 hours. (Patient taking differently: Place 1 patch onto the skin every 72 hours. As Needed.) 5 patch 0    hydrocortisone (CORTEF) 10 MG Tab Take 1 tablet (10 mg total) by mouth 2 (two) times daily. 60 tablet 6    isosorbide mononitrate (IMDUR) 30 MG 24 hr tablet Take 1 tablet (30 mg total) by mouth once daily. 30 tablet 11    levothyroxine (SYNTHROID) 125 MCG tablet Take 1 tablet (125 mcg total) by mouth once daily. 90 tablet 0    lidocaine (XYLOCAINE) 5 % Oint ointment Apply topically as needed. 50 g 1    metoprolol tartrate (LOPRESSOR) 50 MG tablet Take 1 tablet (50 mg  total) by mouth 2 (two) times daily. 60 tablet 11    montelukast (SINGULAIR) 10 mg tablet Take 10 mg by mouth every evening.      pantoprazole (PROTONIX) 40 MG tablet Take 1 tablet (40 mg total) by mouth 2 (two) times daily. 30 tablet 11    potassium chloride (KLOR-CON) 10 MEQ TbSR Take 1 tablet (10 mEq total) by mouth once daily. 30 tablet 5    prasugrel (EFFIENT) 10 mg Tab Take 1 tablet (10 mg total) by mouth once daily. 90 tablet 3    promethazine (PHENERGAN) 25 MG tablet Take 1 tablet (25 mg total) by mouth every 6 (six) hours as needed for Nausea. 30 tablet 1    ranolazine (RANEXA) 1,000 mg Tb12 Take 1 tablet (1,000 mg total) by mouth 2 (two) times daily. 60 tablet 11    sertraline (ZOLOFT) 50 MG tablet Take 1 tablet (50 mg total) by mouth once daily. 30 tablet 5    verapamil (CALAN-SR) 180 MG CR tablet Take 1 tablet (180 mg total) by mouth every evening. 30 tablet 0    lactulose (CHRONULAC) 10 gram/15 mL solution Take 20 g by mouth 3 (three) times daily.       nitroGLYCERIN (NITROSTAT) 0.4 MG SL tablet Place 1 tablet (0.4 mg total) under the tongue every 5 (five) minutes as needed for Chest pain. 10 tablet 0    psyllium husk, with sugar, (METAMUCIL, WITH SUGAR,) 3.4 gram/12 gram Powd Take 3.4 g by mouth 3 (three) times daily as needed. Take 1 TSP with 8 oz water, up to 3x / day. increase to 2 TSP to desired regularity 862 g 3     No current facility-administered medications on file prior to visit.        Review of patient's allergies indicates:   Allergen Reactions    Aspirin Nausea Only    Dilaudid [hydromorphone (bulk)] Itching     Rash , swelling of throat.    Iodine and iodide containing products Other (See Comments)     Closing of Throat. No Shellfish of any kind. Can't eat certain kinds of Regular Fish, ie, can't eat: Tuna, Swordfish, [place]=} these are the major fishes she can't eat.    Morphine Itching, Nausea And Vomiting and Hallucinations    Oxycodone Itching and Swelling      Swelling of Throat.    Penicillins Itching and Rash    Percocet [oxycodone-acetaminophen] Itching and Swelling     Cannot tolerate this medication. Swelling of Throat.    Shellfish containing products Other (See Comments)     Closing of Throat. No Shellfish of any kind. Can't eat certain kinds of Regular Fish, ie, can't eat: Tuna, Swordfish, [place]=} these are the major fishes she can't eat.      Valium [diazepam] Hives and Itching    Vicodin [hydrocodone-acetaminophen] Itching, Nausea Only and Rash    Codeine Itching, Nausea Only and Rash    Demerol [meperidine] Itching and Rash       Physical Exam:  Visit Vitals    /66    Pulse 62    Ht 5' (1.524 m)    Wt 81.7 kg (180 lb 1.9 oz)    BMI 35.18 kg/m2       Gen: pleasant and in NAD  HEENT: sclera non-icteric  Chest: non-labored breathing  CV: RRR ; pulses intact  Abd: soft, NT , ND ; hyperactive BS  Ext: no LE edema   Skin: no rashes,  or lesions   Pscyh: appropriate mood and affect  Neuro: alert, oriented ; no focal deficits noted      Laboratory:  Lab Results   Component Value Date    WBC 12.35 11/11/2016    HGB 9.7 (L) 11/11/2016    HCT 31.1 (L) 11/11/2016    MCV 92 11/11/2016     11/11/2016     CMP  Sodium   Date Value Ref Range Status   01/04/2017 141 136 - 145 mmol/L Final     Potassium   Date Value Ref Range Status   01/04/2017 4.1 3.5 - 5.1 mmol/L Final     Chloride   Date Value Ref Range Status   01/04/2017 109 95 - 110 mmol/L Final     CO2   Date Value Ref Range Status   01/04/2017 23 23 - 29 mmol/L Final     Glucose   Date Value Ref Range Status   01/04/2017 162 (H) 70 - 110 mg/dL Final     BUN, Bld   Date Value Ref Range Status   01/04/2017 8 8 - 23 mg/dL Final     Creatinine   Date Value Ref Range Status   01/04/2017 0.9 0.5 - 1.4 mg/dL Final     Calcium   Date Value Ref Range Status   01/04/2017 9.9 8.7 - 10.5 mg/dL Final     Total Protein   Date Value Ref Range Status   11/10/2016 6.2 6.0 - 8.4 g/dL Final     Albumin   Date  Value Ref Range Status   11/10/2016 3.3 (L) 3.5 - 5.2 g/dL Final     Total Bilirubin   Date Value Ref Range Status   11/10/2016 0.2 0.1 - 1.0 mg/dL Final     Comment:     For infants and newborns, interpretation of results should be based  on gestational age, weight and in agreement with clinical  observations.  Premature Infant recommended reference ranges:  Up to 24 hours.............<8.0 mg/dL  Up to 48 hours............<12.0 mg/dL  3-5 days..................<15.0 mg/dL  6-29 days.................<15.0 mg/dL       Alkaline Phosphatase   Date Value Ref Range Status   11/10/2016 72 55 - 135 U/L Final     AST   Date Value Ref Range Status   11/10/2016 10 10 - 40 U/L Final     ALT   Date Value Ref Range Status   11/10/2016 10 10 - 44 U/L Final     Anion Gap   Date Value Ref Range Status   2017 9 8 - 16 mmol/L Final     eGFR if    Date Value Ref Range Status   2017 >60 >60 mL/min/1.73 m^2 Final     eGFR if non    Date Value Ref Range Status   2017 >60 >60 mL/min/1.73 m^2 Final     Comment:     Calculation used to obtain the estimated glomerular filtration  rate (eGFR) is the CKD-EPI equation. Since race is unknown   in our information system, the eGFR values for   -American and Non--American patients are given   for each creatinine result.         Imagin2016 CT IV contrast  Lung bases are clear.  No pericardial or pleural effusion.  The liver, pancreas, spleen, adrenal glands, and kidneys are unremarkable.  Status post cholecystectomy.  Dilated common bile duct measuring 1.4 cm likely related to prior instrumentation.  No hydronephrosis.  Status post hysterectomy.  No adnexal masses.  Urinary bladder is unremarkable.  GI tract is nonobstructed.  There are postsurgical changes in the descending colon, likely prior partial colectomy.  Sigmoid colon diverticulosis noted.  Note made of prominent diverticulum proximal to the suture line which  demonstrates inspissated barium.  No inflammatory changes.  No evidence for small bowel obstruction.  The appendix is not seen.  Probable duodenal diverticulum noted.  There is no retroperitoneal lymphadenopathy.  There is no ascites.  Abdominal aorta is normal caliber, noting mild calcified plaque.  Regional osseous structures demonstrate no aggressive appearing lytic or blastic lesions.  Degenerative changes noted in the lower lumbar spine.        Assessment:  Olga Smith is a 71 y.o. female who returns for follow up of diarrhea mixed with constipation.   Difficult situation with alternating bowel movements.   Did not respond well to trial of metamucil fiber as caused diarrhea.  Still taking welchol and bentyl.     Suspect IBS- M      Plan:  Give trial of Align probiotics today   - advised to take x 1 month and then notify us and let us know if any changes   - could also consider IBgard, avoidance of dairy (or lactaid)   - further testing to consider: EUS, fecal elastase,   Check stool studies given recent hospitalization and concern for 'oily' stool   - check Cdiff, giardia, and fecal fat  Advised continue docusate daily unless worsening diarrhea  Continue welchol, advised to decrease if having worsening constipation  Scheduled for EGD + small bowel biospies given diarrhea / GERD     RTC 12 weeks      Thang Marsh MD  Gastroenterology Fellow (PGY-[id number])  Pager: [phone]      Orders Placed This Encounter   Procedures    Clostridium difficile EIA    Giardia / Cryptosporidum, EIA    Fecal fat, qualitative

## 2017-01-13 ENCOUNTER — TELEPHONE (OUTPATIENT)
Dept: GASTROENTEROLOGY | Facility: CLINIC | Age: 72
End: 2017-01-13

## 2017-01-16 ENCOUNTER — LAB VISIT (OUTPATIENT)
Dept: LAB | Facility: HOSPITAL | Age: 72
End: 2017-01-16
Attending: INTERNAL MEDICINE
Payer: MEDICARE

## 2017-01-16 DIAGNOSIS — K58.2 IRRITABLE BOWEL SYNDROME WITH BOTH CONSTIPATION AND DIARRHEA: ICD-10-CM

## 2017-01-16 LAB
C DIFF GDH STL QL: NEGATIVE
C DIFF TOX A+B STL QL IA: NEGATIVE

## 2017-01-16 PROCEDURE — 87329 GIARDIA AG IA: CPT

## 2017-01-16 PROCEDURE — 89125 SPECIMEN FAT STAIN: CPT

## 2017-01-16 PROCEDURE — 87449 NOS EACH ORGANISM AG IA: CPT

## 2017-01-17 ENCOUNTER — HOSPITAL ENCOUNTER (EMERGENCY)
Facility: HOSPITAL | Age: 72
Discharge: HOME | End: 2017-01-17
Attending: EMERGENCY MEDICINE
Payer: MEDICARE

## 2017-01-17 ENCOUNTER — TELEPHONE (OUTPATIENT)
Dept: GASTROENTEROLOGY | Facility: CLINIC | Age: 72
End: 2017-01-17

## 2017-01-17 VITALS
HEIGHT: 60 IN | OXYGEN SATURATION: 100 % | WEIGHT: 176 LBS | RESPIRATION RATE: 18 BRPM | BODY MASS INDEX: 34.55 KG/M2 | DIASTOLIC BLOOD PRESSURE: 62 MMHG | TEMPERATURE: 98 F | HEART RATE: 66 BPM | SYSTOLIC BLOOD PRESSURE: 126 MMHG

## 2017-01-17 DIAGNOSIS — J44.1 COPD EXACERBATION: Primary | ICD-10-CM

## 2017-01-17 DIAGNOSIS — J06.9 UPPER RESPIRATORY TRACT INFECTION, UNSPECIFIED TYPE: ICD-10-CM

## 2017-01-17 DIAGNOSIS — R06.02 SHORTNESS OF BREATH: ICD-10-CM

## 2017-01-17 LAB
ALBUMIN SERPL BCP-MCNC: 3.5 G/DL
ALP SERPL-CCNC: 81 U/L
ALT SERPL W/O P-5'-P-CCNC: 7 U/L
ANION GAP SERPL CALC-SCNC: 7 MMOL/L
AST SERPL-CCNC: 12 U/L
BASOPHILS # BLD AUTO: 0.01 K/UL
BASOPHILS NFR BLD: 0.1 %
BILIRUB SERPL-MCNC: 0.3 MG/DL
BUN SERPL-MCNC: 14 MG/DL
CALCIUM SERPL-MCNC: 9.7 MG/DL
CHLORIDE SERPL-SCNC: 108 MMOL/L
CO2 SERPL-SCNC: 24 MMOL/L
CREAT SERPL-MCNC: 0.9 MG/DL
DIFFERENTIAL METHOD: ABNORMAL
EOSINOPHIL # BLD AUTO: 0.6 K/UL
EOSINOPHIL NFR BLD: 5.7 %
ERYTHROCYTE [DISTWIDTH] IN BLOOD BY AUTOMATED COUNT: 15.1 %
EST. GFR  (AFRICAN AMERICAN): >60 ML/MIN/1.73 M^2
EST. GFR  (NON AFRICAN AMERICAN): >60 ML/MIN/1.73 M^2
FAT STL SUDAN IV STN: NORMAL
GLUCOSE SERPL-MCNC: 94 MG/DL
HCT VFR BLD AUTO: 33.9 %
HGB BLD-MCNC: 10.6 G/DL
LYMPHOCYTES # BLD AUTO: 2.6 K/UL
LYMPHOCYTES NFR BLD: 24.4 %
MCH RBC QN AUTO: 28.7 PG
MCHC RBC AUTO-ENTMCNC: 31.3 %
MCV RBC AUTO: 92 FL
MONOCYTES # BLD AUTO: 1.2 K/UL
MONOCYTES NFR BLD: 11 %
NEUTROPHILS # BLD AUTO: 6.4 K/UL
NEUTROPHILS NFR BLD: 58.8 %
PLATELET # BLD AUTO: 329 K/UL
PMV BLD AUTO: 9.8 FL
POTASSIUM SERPL-SCNC: 4.4 MMOL/L
PROT SERPL-MCNC: 6.5 G/DL
RBC # BLD AUTO: 3.69 M/UL
SODIUM SERPL-SCNC: 139 MMOL/L
WBC # BLD AUTO: 10.79 K/UL

## 2017-01-17 PROCEDURE — 94640 AIRWAY INHALATION TREATMENT: CPT

## 2017-01-17 PROCEDURE — 85025 COMPLETE CBC W/AUTO DIFF WBC: CPT

## 2017-01-17 PROCEDURE — 96374 THER/PROPH/DIAG INJ IV PUSH: CPT

## 2017-01-17 PROCEDURE — 99284 EMERGENCY DEPT VISIT MOD MDM: CPT | Mod: 25

## 2017-01-17 PROCEDURE — 25000003 PHARM REV CODE 250: Performed by: EMERGENCY MEDICINE

## 2017-01-17 PROCEDURE — 63600175 PHARM REV CODE 636 W HCPCS: Performed by: EMERGENCY MEDICINE

## 2017-01-17 PROCEDURE — 25000242 PHARM REV CODE 250 ALT 637 W/ HCPCS: Performed by: EMERGENCY MEDICINE

## 2017-01-17 PROCEDURE — 80053 COMPREHEN METABOLIC PANEL: CPT

## 2017-01-17 PROCEDURE — 96361 HYDRATE IV INFUSION ADD-ON: CPT

## 2017-01-17 PROCEDURE — 96375 TX/PRO/DX INJ NEW DRUG ADDON: CPT

## 2017-01-17 RX ORDER — RANOLAZINE 500 MG/1
TABLET, FILM COATED, EXTENDED RELEASE ORAL
COMMUNITY
Start: 2017-01-13

## 2017-01-17 RX ORDER — PROMETHAZINE HYDROCHLORIDE 25 MG/1
25 TABLET ORAL EVERY 6 HOURS PRN
Qty: 30 TABLET | Refills: 1 | Status: SHIPPED | OUTPATIENT
Start: 2017-01-17 | End: 2017-03-26

## 2017-01-17 RX ORDER — PREDNISONE 50 MG/1
50 TABLET ORAL DAILY
Qty: 4 TABLET | Refills: 0 | Status: SHIPPED | OUTPATIENT
Start: 2017-01-17 | End: 2017-01-21

## 2017-01-17 RX ORDER — METHYLPREDNISOLONE SODIUM SUCCINATE 125 MG/2ML
125 INJECTION INTRAMUSCULAR; INTRAVENOUS
Status: COMPLETED | OUTPATIENT
Start: 2017-01-17 | End: 2017-01-17

## 2017-01-17 RX ORDER — IPRATROPIUM BROMIDE AND ALBUTEROL SULFATE 2.5; .5 MG/3ML; MG/3ML
3 SOLUTION RESPIRATORY (INHALATION)
Status: COMPLETED | OUTPATIENT
Start: 2017-01-17 | End: 2017-01-17

## 2017-01-17 RX ORDER — DOXYCYCLINE 100 MG/1
100 CAPSULE ORAL 2 TIMES DAILY
Qty: 20 CAPSULE | Refills: 0 | Status: SHIPPED | OUTPATIENT
Start: 2017-01-17 | End: 2017-01-27

## 2017-01-17 RX ORDER — HEPARIN 100 UNIT/ML
500 SYRINGE INTRAVENOUS
Status: COMPLETED | OUTPATIENT
Start: 2017-01-17 | End: 2017-01-17

## 2017-01-17 RX ADMIN — METHYLPREDNISOLONE SODIUM SUCCINATE 125 MG: 125 INJECTION, POWDER, FOR SOLUTION INTRAMUSCULAR; INTRAVENOUS at 09:01

## 2017-01-17 RX ADMIN — IPRATROPIUM BROMIDE AND ALBUTEROL SULFATE 3 ML: .5; 3 SOLUTION RESPIRATORY (INHALATION) at 09:01

## 2017-01-17 RX ADMIN — HEPARIN 500 UNITS: 100 SYRINGE at 11:01

## 2017-01-17 NOTE — TELEPHONE ENCOUNTER
Patient states that she has already been advised that she has no stomach infection.  Patient states that she is at the pharmacy counter and needs the prescriptions received from the ED today but she left the prescriptions in her car.  Promethazine refill forwarded for review.

## 2017-01-17 NOTE — TELEPHONE ENCOUNTER
Prescriptions for alendronate and promethazine called in to geoff Yuan, @ [facility] pharmacy.  Informed pharmacist that the patient states that the other prescriptions she requested were in her car and were provided by an ED physician today.  Informed that this nurse did not feel comfortable providing those prescriptions as that provider was not in this office and the patient did have the prescriptions available to her.  Pharmacist verbalized understanding.

## 2017-01-17 NOTE — ED TRIAGE NOTES
Sob with cough x several days getting worse yesterday into today.   Also had a fall around [place] time.  Complaints of pain to the right ankle.

## 2017-01-17 NOTE — ED PROVIDER NOTES
"Encounter Date: 1/17/2017    SCRIBE #1 NOTE: I, Victor Hugo Isbell, am scribing for, and in the presence of,  Matt Burks MD. I have scribed the following portions of the note - Other sections scribed: HPI/ROS.       History     Chief Complaint   Patient presents with    Shortness of Breath     since yesterday afternoon w dry cough     Review of patient's allergies indicates:   Allergen Reactions    Aspirin Nausea Only    Dilaudid [hydromorphone (bulk)] Itching     Rash , swelling of throat.    Iodine and iodide containing products Other (See Comments)     Closing of Throat. No Shellfish of any kind. Can't eat certain kinds of Regular Fish, ie, can't eat: Tuna, Swordfish, [place]=} these are the major fishes she can't eat.    Morphine Itching, Nausea And Vomiting and Hallucinations    Oxycodone Itching and Swelling     Swelling of Throat.    Penicillins Itching and Rash    Percocet [oxycodone-acetaminophen] Itching and Swelling     Cannot tolerate this medication. Swelling of Throat.    Shellfish containing products Other (See Comments)     Closing of Throat. No Shellfish of any kind. Can't eat certain kinds of Regular Fish, ie, can't eat: Tuna, Swordfish, [place]=} these are the major fishes she can't eat.      Valium [diazepam] Hives and Itching    Vicodin [hydrocodone-acetaminophen] Itching, Nausea Only and Rash    Codeine Itching, Nausea Only and Rash    Demerol [meperidine] Itching and Rash     HPI Comments: CC: SOB    HPI: This 72 y.o. Female with IBS, COPD, asthma, HTN, Fibromyalgia, Coronary artery disease, pacemaker, thyroid disease, and craniopharyngioma presents to the ED c/o SOB. There's  Associated chills, a dry cough, sneezing, wheezing, rhinorrhea, voice change, general body aches. Symptoms worsened today since onset "a couple days ago". Symptoms are acute and severe. There's no attempted treatment despite having an albuterol inhaler. The pt denies chest pain, leg pain, vomiting, or " diarrhea.      The history is provided by the patient. No  was used.     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    Encounter for blood transfusion     Extravasation injury of IV catheter site with other complication 2008     Pain remains to left forearm, no IV sticks    Fibromyalgia     Glaucoma     Hypertension     Irritable bowel syndrome (IBS)     Osteoporosis 8/7/2015    Pacemaker     Thyroid disease     Tumor of optic nerve      Past Medical History Pertinent Negatives   Diagnosis Date Noted    Amblyopia 11/4/2015    Cataract 11/4/2015    Diabetes mellitus 11/29/2016    Diabetic retinopathy 11/4/2015    Macular degeneration 11/4/2015    Retinal detachment 11/4/2015    Sickle cell anemia 11/4/2015    Sickle cell trait 11/4/2015    Strabismus 11/4/2015    Transfusion reaction 10/7/2016    Uveitis 11/4/2015     Past Surgical History   Procedure Laterality Date    Hysterectomy      Abdominal surgery      Hernia repair      Cardiac surgery       stents and pacemaker    Small intestine surgery      Colonoscopy N/A 9/19/2016     Procedure: COLONOSCOPY;  Surgeon: Lisandro Kaba MD;  Location: [facility];  Service: Endoscopy;  Laterality: N/A;  OUT PATIENT [facility] GI/CAN'T DO PROCEDURE AT [facility], HAS TO COME HERE    Tracheostomy tube placement       pt kept x 8.5 years. Removed during 2009     Family History   Problem Relation Age of Onset    Cataracts Brother     Glaucoma Brother     Glaucoma Mother     Lung cancer Mother     Coronary artery disease Mother     Coronary artery disease Father     Cataracts Sister     Glaucoma Sister     No Known Problems Maternal Aunt     No Known Problems Maternal Uncle     No Known Problems Paternal Aunt     No Known Problems Paternal Uncle     No Known Problems Maternal Grandmother      No Known Problems Maternal Grandfather     No Known Problems Paternal Grandmother     No Known Problems Paternal Grandfather     Colon cancer Neg Hx     Colon polyps Neg Hx     Amblyopia Neg Hx     Blindness Neg Hx     Cancer Neg Hx     Diabetes Neg Hx     Hypertension Neg Hx     Macular degeneration Neg Hx     Retinal detachment Neg Hx     Strabismus Neg Hx     Stroke Neg Hx     Thyroid disease Neg Hx      Social History   Substance Use Topics    Smoking status: Former Smoker     Packs/day: 1.00     Years: 25.00     Types: Cigarettes     Quit date: 11/9/1985    Smokeless tobacco: Never Used    Alcohol use No     Review of Systems   Constitutional: Positive for chills. Negative for fever.   HENT: Positive for rhinorrhea, sneezing and voice change. Negative for ear pain and sore throat.    Eyes: Negative for visual disturbance.   Respiratory: Positive for cough, shortness of breath and wheezing.    Cardiovascular: Negative for chest pain.   Gastrointestinal: Negative for abdominal pain, constipation, diarrhea, nausea and vomiting.   Genitourinary: Negative for difficulty urinating and dysuria.   Musculoskeletal: Positive for myalgias. Negative for back pain.   Skin: Negative for rash.   Neurological: Negative for dizziness, weakness, light-headedness and headaches.       Physical Exam   Initial Vitals   BP Pulse Resp Temp SpO2   01/17/17 0910 01/17/17 0910 01/17/17 0910 01/17/17 0910 01/17/17 0910   109/71 82 18 98.1 °F (36.7 °C) 98 %     Physical Exam    Nursing note and vitals reviewed.  Constitutional: She appears well-developed and well-nourished. She is not diaphoretic. No distress.   HENT:   Head: Normocephalic and atraumatic.   Right Ear: External ear normal.   Left Ear: External ear normal.   Nose: Nose normal.   Mouth/Throat: Oropharynx is clear and moist.   Eyes: Conjunctivae and EOM are normal. Pupils are equal, round, and reactive to light. Right eye exhibits no discharge. Left eye  exhibits no discharge. No scleral icterus.   Neck: Normal range of motion. Neck supple.   Cardiovascular: Normal rate, regular rhythm, normal heart sounds and intact distal pulses.   No murmur heard.  Pulmonary/Chest: She is in respiratory distress. She has wheezes. She has no rhonchi. She has no rales.   There are scattered inspiratory and expiratory wheezes noted on examination.  Lung sounds are symmetric bilaterally.  There are no rales or rhonchi.  The patient is in mild respiratory distress.   Abdominal: Soft. Bowel sounds are normal. She exhibits no distension and no mass. There is no tenderness. There is no rebound and no guarding.   Musculoskeletal: Normal range of motion. She exhibits no edema or tenderness.   Neurological: She is alert and oriented to person, place, and time. She has normal strength. No cranial nerve deficit or sensory deficit.   Skin: Skin is warm and dry. No rash noted. No erythema. No pallor.   Psychiatric: She has a normal mood and affect. Her behavior is normal. Judgment and thought content normal.         ED Course   Procedures  Labs Reviewed   CBC W/ AUTO DIFFERENTIAL - Abnormal; Notable for the following:        Result Value    RBC 3.69 (*)     Hemoglobin 10.6 (*)     Hematocrit 33.9 (*)     MCHC 31.3 (*)     RDW 15.1 (*)     Mono # 1.2 (*)     Eos # 0.6 (*)     All other components within normal limits   COMPREHENSIVE METABOLIC PANEL - Abnormal; Notable for the following:     ALT 7 (*)     Anion Gap 7 (*)     All other components within normal limits     EKG Readings: (Independently Interpreted)   Initial Reading: No STEMI.   EKG reviewed and interpreted by me shows sinus rhythm and rate is 68.  MO interval is short.  QRS and QT intervals are within normal limits.  There is normal axis.  Is good R-wave progression.  There are Q waves in the inferior leads as well as T-wave inversions in the anterior leads similar morphology to EKG from November 11, 2016.       X-Rays:    Independently Interpreted Readings:   Other Readings:  Chest x-ray reviewed and interpreted by me shows no evidence of pulmonary edema, pneumothorax, or consolidations/ infiltrates.    Medical Decision Making:   ED Management:  This is the emergent evaluation of a 72-year-old female presents the emergency department complaining of shortness of breath and cough for 2 days.  Also complains of nasal congestion.Differential diagnosis at the time of initial evaluation included, but was not limited to: COPD exacerbation, viral illness, pneumonia, pneumothorax.  I considered but doubt coronary ischemia and acute pulmonary embolus.    Chest x-ray clear of infiltrate or consolidation.  The patient's laboratory evaluation shows a baseline normocytic anemia.  EKG reveals baseline ischemic changes with no other acute findings.  Patient likely has a viral URI with associated COPD exacerbation.  She was given DuoNeb treatments and Solu-Medrol and IV fluids with good effect.  I will continue her on prednisone as well as every 4 hours as needed albuterol inhaler.  She will receive antibiotics in the setting of COPD exacerbation for chronic colonization.  She has been advised to follow-up with her PCP in the next 2 days and return for new or worsening symptoms such as worsening shortness of breath or chest pain.            Scribe Attestation:   Scribe #1: I performed the above scribed service and the documentation accurately describes the services I performed. I attest to the accuracy of the note.    Attending Attestation:           Physician Attestation for Scribe:  Physician Attestation Statement for Scribe #1: I, Matt Burks MD, reviewed documentation, as scribed by Victor Hugo Isbell in my presence, and it is both accurate and complete.                 ED Course     Clinical Impression:   The primary encounter diagnosis was COPD exacerbation. Diagnoses of Shortness of breath and Upper respiratory tract infection, unspecified type  were also pertinent to this visit.          Matt Burks Jr., MD  01/17/17 6419

## 2017-01-17 NOTE — TELEPHONE ENCOUNTER
----- Message from Otto De Souza MD sent at 1/16/2017  2:19 PM CST -----  Stool test negative for C.diff.

## 2017-01-17 NOTE — ED AVS SNAPSHOT
[facility]  [place] [address]               Olga Smith   2017  9:12 AM   ED    Description:  Female : 1945   Department:  [facility]           Your Care was Coordinated By:     Provider Role From To    Matt Burks Jr., MD Attending Provider 17 0914 --      Reason for Visit     Shortness of Breath           Diagnoses this Visit        Comments    COPD exacerbation    -  Primary     Shortness of breath         Upper respiratory tract infection, unspecified type           ED Disposition     None           To Do List           Follow-up Information     Follow up with Valencia Palencia MD. Schedule an appointment as soon as possible for a visit in 2 days.    Specialty:  Family Medicine    Why:  Please follow-up with your PCP in the next 2 days for further evaluation and management.  Use inhaler every 4 hours as needed.  Medications as prescribed.  Return for new or worsening symptoms such as fever or shortness of breath or chest pain.    Contact information:    [address] [place] [address] [phone]         These Medications        Disp Refills Start End    doxycycline (VIBRAMYCIN) 100 MG Cap 20 capsule 0 2017    Take 1 capsule (100 mg total) by mouth 2 (two) times daily. - Oral    [facility] Ph #: [phone]       predniSONE (DELTASONE) 50 MG Tab 4 tablet 0 2017    Take 1 tablet (50 mg total) by mouth once daily. - Oral    [facility] [address] Ph #: [phone]         [facility]     [facility] Care [facility] -  Assistance  Registered nurses in the [facility] provide clinical advisement, health education, appointment booking, and other advisory services.  Call for this free service at [phone].              Medications           Message regarding Medications     Verify the changes and/or additions to your medication regime listed below are the same as discussed with your clinician today.  If any of these changes or additions are incorrect, please notify your healthcare provider.        START taking these NEW medications        Refills    doxycycline (VIBRAMYCIN) 100 MG Cap 0    Sig: Take 1 capsule (100 mg total) by mouth 2 (two) times daily.    Class: Print    Route: Oral    predniSONE (DELTASONE) 50 MG Tab 0    Sig: Take 1 tablet (50 mg total) by mouth once daily.    Class: Print    Route: Oral      These medications were administered today        Dose Freq    methylPREDNISolone sodium succinate injection 125 mg 125 mg ED 1 Time    Sig: Inject 125 mg into the vein ED 1 Time.    Class: Normal    Route: Intravenous    sodium chloride 0.9% bolus 500 mL 500 mL ED 1 Time    Sig: Inject 500 mLs into the vein ED 1 Time.    Class: Normal    Route: Intravenous    albuterol-ipratropium 2.5mg-0.5mg/3mL nebulizer solution 3 mL 3 mL Every 5 min    Sig: Take 3 mLs by nebulization every 5 (five) minutes.    Class: Normal    Route: Nebulization           Verify that the below list of medications is an accurate representation of the medications you are currently taking.  If none reported, the list may be blank. If incorrect, please contact your healthcare provider. Carry this list with you in case of emergency.           Current Medications     albuterol (PROAIR HFA) 90 mcg/actuation inhaler Inhale 2 puffs into the lungs every 6 (six) hours as needed for Wheezing.    alendronate (FOSAMAX) 70 MG tablet Take 1 tablet (70 mg total) by mouth every 7 days.    aspirin (ECOTRIN) 81 MG EC tablet Take 1 tablet (81 mg total) by mouth once daily.    atorvastatin (LIPITOR) 80 MG tablet Take 1 tablet (80 mg total) by mouth once daily.    baclofen (LIORESAL) 10 MG tablet Take 1 tablet (10 mg total) by mouth 3 (three) times daily. Take half  (5mg)  tablet three times a day    bimatoprost (LUMIGAN) 0.01 % Drop Place 1 drop into both eyes every evening.    calcium carbonate-vitamin D3 (CALTRATE 600 + D) 600 mg (1,500 mg)-800 unit Chew Take 2 tablets by mouth 2 (two) times daily. Pt. Takes= 1.5 tabs every AM & 1 TAB Every evening.    colesevelam (WELCHOL) 625 mg tablet Take 3 tablets (1,875 mg total) by mouth every evening.    cyclobenzaprine (FLEXERIL) 5 MG tablet Take 1 tablet (5 mg total) by mouth 3 (three) times daily as needed for Muscle spasms.    dicyclomine (BENTYL) 10 MG capsule Take 1 capsule (10 mg total) by mouth 4 (four) times daily before meals and nightly.    diphenhydrAMINE (BENADRYL) 25 mg capsule Take 1 each (25 mg total) by mouth nightly as needed for Itching.    fentaNYL (DURAGESIC) 25 mcg/hr Place 1 patch onto the skin every 72 hours.    hydrocortisone (CORTEF) 10 MG Tab Take 1 tablet (10 mg total) by mouth 2 (two) times daily.    isosorbide mononitrate (IMDUR) 30 MG 24 hr tablet Take 1 tablet (30 mg total) by mouth once daily.    lactulose (CHRONULAC) 10 gram/15 mL solution Take 20 g by mouth 3 (three) times daily.     levothyroxine (SYNTHROID) 125 MCG tablet Take 1 tablet (125 mcg total) by mouth once daily.    metoprolol tartrate (LOPRESSOR) 50 MG tablet Take 1 tablet (50 mg total) by mouth 2 (two) times daily.    montelukast (SINGULAIR) 10 mg tablet Take 10 mg by mouth every evening.    pantoprazole (PROTONIX) 40 MG tablet Take 1 tablet (40 mg total) by mouth 2 (two) times daily.    potassium chloride (KLOR-CON) 10 MEQ TbSR Take 1 tablet (10 mEq total) by mouth once daily.    prasugrel (EFFIENT) 10 mg Tab Take 1 tablet (10 mg total) by mouth once daily.    promethazine (PHENERGAN) 25 MG tablet Take 1 tablet (25 mg total) by mouth every 6 (six) hours as needed for Nausea.    psyllium husk, with sugar, (METAMUCIL, WITH SUGAR,) 3.4 gram/12 gram Powd Take 3.4 g by mouth 3 (three) times daily as needed. Take 1 TSP with 8 oz water, up to  3x / day. increase to 2 TSP to desired regularity    ranolazine (RANEXA) 1,000 mg Tb12 Take 1 tablet (1,000 mg total) by mouth 2 (two) times daily.    sertraline (ZOLOFT) 50 MG tablet Take 1 tablet (50 mg total) by mouth once daily.    verapamil (CALAN-SR) 180 MG CR tablet Take 1 tablet (180 mg total) by mouth every evening.    doxycycline (VIBRAMYCIN) 100 MG Cap Take 1 capsule (100 mg total) by mouth 2 (two) times daily.    lidocaine (XYLOCAINE) 5 % Oint ointment Apply topically as needed.    nitroGLYCERIN (NITROSTAT) 0.4 MG SL tablet Place 1 tablet (0.4 mg total) under the tongue every 5 (five) minutes as needed for Chest pain.    predniSONE (DELTASONE) 50 MG Tab Take 1 tablet (50 mg total) by mouth once daily.    sodium chloride 0.9% bolus 500 mL Inject 500 mLs into the vein ED 1 Time.           Clinical Reference Information           Your Vitals Were     BP Pulse Temp Resp Height Weight    109/71 65 98.1 °F (36.7 °C) (Oral) 18 5' (1.524 m) 79.8 kg (176 lb)    SpO2 BMI             98% 34.37 kg/m2         Allergies as of 1/17/2017        Reactions    Aspirin Nausea Only    Dilaudid [Hydromorphone (Bulk)] Itching    Rash , swelling of throat.    Iodine And Iodide Containing Products Other (See Comments)    Closing of Throat. No Shellfish of any kind. Can't eat certain kinds of Regular Fish, ie, can't eat: Tuna, Swordfish, [place]=} these are the major fishes she can't eat.    Morphine Itching, Nausea And Vomiting, Hallucinations    Oxycodone Itching, Swelling    Swelling of Throat.    Penicillins Itching, Rash    Percocet [Oxycodone-acetaminophen] Itching, Swelling    Cannot tolerate this medication. Swelling of Throat.    Shellfish Containing Products Other (See Comments)    Closing of Throat. No Shellfish of any kind. Can't eat certain kinds of Regular Fish, ie, can't eat: Tuna, Swordfish, [place]=} these are the major fishes she can't eat.    Valium [Diazepam] Hives, Itching    Vicodin [Hydrocodone-acetaminophen]  Itching, Nausea Only, Rash    Codeine Itching, Nausea Only, Rash    Demerol [Meperidine] Itching, Rash      Immunizations Administered on Date of Encounter - 1/17/2017     None      ED Micro, Lab, POCT     Start Ordered       Status Ordering Provider    01/17/17 0922 01/17/17 0921  CBC auto differential  Once      Final result     01/17/17 0922 01/17/17 0921  Comprehensive metabolic panel  STAT      Final result       ED Imaging Orders     Start Ordered       Status Ordering Provider    01/17/17 0922 01/17/17 0921  X-Ray Chest PA And Lateral  1 time imaging      Final result       Discharge References/Attachments     COPD FLARE (ENGLISH)      Your Scheduled Appointments     Feb 01, 2017 10:30 AM CST   Infusion 15 Min with CHAIR 01 [facility])    [address] [facility] [address]   [phone]            Feb 08, 2017  8:40 AM CST   Established Patient Visit with Bandar Valladares MD   [facility] - Card[facility])    [address] [address]   [phone]            Apr 13, 2017  3:00 PM CDT   GASTROENTEROLOGY ESTABLISHED PATIENT with Thang Marsh MD   [facility] - Gastroenterology ([facility] )    [address]  [place] [address] [phone]            May 16, 2017 10:15 AM CDT   [facility] with PERIMETRY, ASHLYN   [facility] - Ophthalmology ([facility] )    [address]  [place] [address] [phone]            May 16, 2017 10:45 AM CDT   Established Patient Visit with Emilie Reynolds MD   [facility] - Ophthalmology ([facility] )    [address]  [place] [address] [phone]              Your Future Surgeries/Procedures     Feb 02, 2017   Surgery with Emanuel Ozuna MD   [facility]    [address] [id number] [phone]              Smoking Cessation     If you would like to quit  smoking:   You may be eligible for free services if you are a [place] resident and started smoking cigarettes before September 1, 1988.  Call the Smoking Cessation Trust (SCT) toll free at (173) 692-2017 or (494) 948-7354.   Call 4-800-QUIT-NOW if you do not meet the above criteria.             [facility] complies with applicable Federal civil rights laws and does not discriminate on the basis of race, color, national origin, age, disability, or sex.        Language Assistance Services     ATTENTION: Language assistance services are available, free of charge. Please call [phone].      ATENCIÓN: Si habla español, tiene a avilez disposición servicios gratuitos de asistencia lingüística. Llame al [phone].     CHÚ Ý: N?u b?n nói Ti?ng Vi?t, có các d?ch v? h? tr? ngôn ng? mi?n phí dành cho b?n. G?i s? [phone].

## 2017-01-17 NOTE — TELEPHONE ENCOUNTER
----- Message from Nate Shi sent at 1/16/2017 10:48 AM CST -----  Contact: self  Pt requesting a call back regarding stomach infection. Please call pt at .      Refill:    alendronate (FOSAMAX) 70 MG tablet  promethazine (PHENERGAN) 25 MG tablet    Thanks

## 2017-01-18 LAB
CRYPTOSP AG STL QL IA: NEGATIVE
G LAMBLIA AG STL QL IA: NEGATIVE

## 2017-01-24 ENCOUNTER — HOSPITAL ENCOUNTER (EMERGENCY)
Facility: HOSPITAL | Age: 72
Discharge: HOME | End: 2017-01-24
Attending: EMERGENCY MEDICINE
Payer: MEDICARE

## 2017-01-24 VITALS
BODY MASS INDEX: 35.34 KG/M2 | RESPIRATION RATE: 20 BRPM | SYSTOLIC BLOOD PRESSURE: 150 MMHG | DIASTOLIC BLOOD PRESSURE: 80 MMHG | OXYGEN SATURATION: 98 % | HEART RATE: 70 BPM | TEMPERATURE: 98 F | HEIGHT: 60 IN | WEIGHT: 180 LBS

## 2017-01-24 DIAGNOSIS — M25.561 ACUTE PAIN OF RIGHT KNEE: ICD-10-CM
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
## 2017-01-24 DIAGNOSIS — M25.571 ACUTE RIGHT ANKLE PAIN: ICD-10-CM

## 2017-01-24 DIAGNOSIS — G89.11 ACUTE TRAUMATIC PAIN: ICD-10-CM

## 2017-01-24 PROCEDURE — 99283 EMERGENCY DEPT VISIT LOW MDM: CPT

## 2017-01-24 NOTE — ED AVS SNAPSHOT
[facility]  [address] [place] [address]               Olga Smith   2017  8:16 AM   ED    Description:  Female : 1945   Department:  [facility]           Your Care was Coordinated By:     Provider Role From To    Chauncey Arizmendi MD Attending Provider 17 0819 --      Reason for Visit     Ankle Pain           Diagnoses this Visit        Comments    Fall, initial encounter    -  Primary     Acute traumatic pain         Acute right ankle pain         Acute pain of right knee           ED Disposition     ED Disposition Condition Comment    Discharge             To Do List           Follow-up Information     Schedule an appointment as soon as possible for a visit with Valencia Palencia MD.    Specialty:  Family Medicine    Contact information:    [address] [address] [phone]        [facility]     [facility] -  Assistance  Registered nurses in the [facility] provide clinical advisement, health education, appointment booking, and other advisory services.  Call for this free service at [phone].             Medications           Message regarding Medications     Verify the changes and/or additions to your medication regime listed below are the same as discussed with your clinician today.  If any of these changes or additions are incorrect, please notify your healthcare provider.        STOP taking these medications     verapamil (CALAN-SR) 180 MG CR tablet Take 1 tablet (180 mg total) by mouth every evening.    lactulose (CHRONULAC) 10 gram/15 mL solution Take 20 g by mouth 3 (three) times daily.            Verify that the below list of medications is an accurate representation of the medications you are currently taking.  If none reported, the list may be blank. If incorrect, please contact your healthcare provider. Carry this list with you in case of emergency.            Current Medications     albuterol (PROAIR HFA) 90 mcg/actuation inhaler Inhale 2 puffs into the lungs every 6 (six) hours as needed for Wheezing.    alendronate (FOSAMAX) 70 MG tablet Take 1 tablet (70 mg total) by mouth every 7 days.    aspirin (ECOTRIN) 81 MG EC tablet Take 1 tablet (81 mg total) by mouth once daily.    atorvastatin (LIPITOR) 80 MG tablet Take 1 tablet (80 mg total) by mouth once daily.    baclofen (LIORESAL) 10 MG tablet Take 1 tablet (10 mg total) by mouth 3 (three) times daily. Take half (5mg)  tablet three times a day    bimatoprost (LUMIGAN) 0.01 % Drop Place 1 drop into both eyes every evening.    calcium carbonate-vitamin D3 (CALTRATE 600 + D) 600 mg (1,500 mg)-800 unit Chew Take 2 tablets by mouth 2 (two) times daily. Pt. Takes= 1.5 tabs every AM & 1 TAB Every evening.    colesevelam (WELCHOL) 625 mg tablet Take 3 tablets (1,875 mg total) by mouth every evening.    cyclobenzaprine (FLEXERIL) 5 MG tablet Take 1 tablet (5 mg total) by mouth 3 (three) times daily as needed for Muscle spasms.    dicyclomine (BENTYL) 10 MG capsule Take 1 capsule (10 mg total) by mouth 4 (four) times daily before meals and nightly.    diphenhydrAMINE (BENADRYL) 25 mg capsule Take 1 each (25 mg total) by mouth nightly as needed for Itching.    doxycycline (VIBRAMYCIN) 100 MG Cap Take 1 capsule (100 mg total) by mouth 2 (two) times daily.    fentaNYL (DURAGESIC) 25 mcg/hr Place 1 patch onto the skin every 72 hours.    hydrocortisone (CORTEF) 10 MG Tab Take 1 tablet (10 mg total) by mouth 2 (two) times daily.    isosorbide mononitrate (IMDUR) 30 MG 24 hr tablet Take 1 tablet (30 mg total) by mouth once daily.    levothyroxine (SYNTHROID) 125 MCG tablet Take 1 tablet (125 mcg total) by mouth once daily.    lidocaine (XYLOCAINE) 5 % Oint ointment Apply topically as needed.    metoprolol tartrate (LOPRESSOR) 50 MG tablet Take 1 tablet (50 mg total) by mouth 2 (two) times daily.    montelukast (SINGULAIR) 10  mg tablet Take 10 mg by mouth every evening.    nitroGLYCERIN (NITROSTAT) 0.4 MG SL tablet Place 1 tablet (0.4 mg total) under the tongue every 5 (five) minutes as needed for Chest pain.    pantoprazole (PROTONIX) 40 MG tablet Take 1 tablet (40 mg total) by mouth 2 (two) times daily.    potassium chloride (KLOR-CON) 10 MEQ TbSR Take 1 tablet (10 mEq total) by mouth once daily.    prasugrel (EFFIENT) 10 mg Tab Take 1 tablet (10 mg total) by mouth once daily.    promethazine (PHENERGAN) 25 MG tablet Take 1 tablet (25 mg total) by mouth every 6 (six) hours as needed for Nausea.    psyllium husk, with sugar, (METAMUCIL, WITH SUGAR,) 3.4 gram/12 gram Powd Take 3.4 g by mouth 3 (three) times daily as needed. Take 1 TSP with 8 oz water, up to 3x / day. increase to 2 TSP to desired regularity    RANEXA 500 mg Tb12     ranolazine (RANEXA) 1,000 mg Tb12 Take 1 tablet (1,000 mg total) by mouth 2 (two) times daily.    sertraline (ZOLOFT) 50 MG tablet Take 1 tablet (50 mg total) by mouth once daily.           Clinical Reference Information           Your Vitals Were     BP Pulse Temp Resp Height Weight    153/81 (BP Location: Right arm, Patient Position: Sitting) 70 97.9 °F (36.6 °C) (Oral) 17 5' (1.524 m) 81.6 kg (180 lb)    SpO2 BMI             98% 35.15 kg/m2         Allergies as of 1/24/2017        Reactions    Aspirin Nausea Only    Dilaudid [Hydromorphone (Bulk)] Itching    Rash , swelling of throat.    Iodine And Iodide Containing Products Other (See Comments)    Closing of Throat. No Shellfish of any kind. Can't eat certain kinds of Regular Fish, ie, can't eat: Tuna, Swordfish, [place]=} these are the major fishes she can't eat.    Morphine Itching, Nausea And Vomiting, Hallucinations    Oxycodone Itching, Swelling    Swelling of Throat.    Penicillins Itching, Rash    Percocet [Oxycodone-acetaminophen] Itching, Swelling    Cannot tolerate this medication. Swelling of Throat.    Shellfish Containing Products Other (See  Comments)    Closing of Throat. No Shellfish of any kind. Can't eat certain kinds of Regular Fish, ie, can't eat: Tuna, Swordfish, [place]=} these are the major fishes she can't eat.    Valium [Diazepam] Hives, Itching    Vicodin [Hydrocodone-acetaminophen] Itching, Nausea Only, Rash    Codeine Itching, Nausea Only, Rash    Demerol [Meperidine] Itching, Rash      Immunizations Administered on Date of Encounter - 1/24/2017     None      ED Micro, Lab, POCT     None      ED Imaging Orders     Start Ordered       Status Ordering Provider    01/24/17 0830 01/24/17 0830  X-Ray Ankle Complete Right  1 time imaging      Final result     01/24/17 0830 01/24/17 0830  X-Ray Knee 3 View Right  1 time imaging      Final result       Discharge References/Attachments     FALL, MECHANICAL (ENGLISH)    LOWER EXTREMITY CONTUSION (ENGLISH)      Your Scheduled Appointments     Jan 26, 2017  7:40 AM CST   Established Patient Visit with Valencia Palencia MD   [facility] - [address] [place] [address]   [phone]            Feb 01, 2017 10:30 AM CST   Infusion 15 Min with CHAIR 01 [facility])    [address] [place] [id number] [phone]            Feb 08, 2017  8:40 AM CST   Established Patient Visit with Bandar Valladares MD   [facility] [place] - Cardiology [facility] [place])    [address] [facility] [place] [address] [phone]            Apr 13, 2017  3:00 PM CDT   GASTROENTEROLOGY ESTABLISHED PATIENT with MD Gonzalo Wasserman - Gastroenterology (Dave javier )    [address] [facility] [place] [address] [phone]            May 16, 2017 10:15 AM CDT   [facility] with PERIMETRY, ASHLYN Rae - Ophthalmology (Dave javier )    [address] [facility] [place] [address] [phone]              Your Future Surgeries/Procedures     Feb 02, 2017   Surgery with Emanuel Ozuna MD   [facility])    [address] [facility] [id number] [phone]              Smoking Cessation     If you would like to quit smoking:   You may be eligible for free services if you are a [place] resident and started smoking cigarettes before September 1, 1988.  Call the Smoking Cessation Trust (SCT) toll free at (702) 298-2392 or (241) 141-8537.   Call 1-800-QUIT-NOW if you do not meet the above criteria.             [facility] complies with applicable Federal civil rights laws and does not discriminate on the basis of race, color, national origin, age, disability, or sex.        Language Assistance Services     ATTENTION: Language assistance services are available, free of charge. Please call [phone].      ATENCIÓN: Si habla español, tiene a avilez disposición servicios gratuitos de asistencia lingüística. Llame al [phone].     CHÚ Ý: N?u b?n nói Ti?ng Vi?t, có các d?ch v? h? tr? ngôn ng? mi?n phí dành cho b?n. G?i s? [phone].

## 2017-01-24 NOTE — ED PROVIDER NOTES
Encounter Date: 1/24/2017    SCRIBE #1 NOTE: I, Melinda Garza, am scribing for, and in the presence of,  Chauncey Arizmendi MD. I have scribed the following portions of the note - Other sections scribed: HPI, ROS.       History     Chief Complaint   Patient presents with    Ankle Pain     Pt. c/o right ankle pain after falling last week. pt. reports increase in pain and swelling. Pt. ambulated to triage [place] and desk with no difficulty.  accompanying patient for ride.      Review of patient's allergies indicates:   Allergen Reactions    Aspirin Nausea Only    Dilaudid [hydromorphone (bulk)] Itching     Rash , swelling of throat.    Iodine and iodide containing products Other (See Comments)     Closing of Throat. No Shellfish of any kind. Can't eat certain kinds of Regular Fish, ie, can't eat: Tuna, Swordfish, [place]=} these are the major fishes she can't eat.    Morphine Itching, Nausea And Vomiting and Hallucinations    Oxycodone Itching and Swelling     Swelling of Throat.    Penicillins Itching and Rash    Percocet [oxycodone-acetaminophen] Itching and Swelling     Cannot tolerate this medication. Swelling of Throat.    Shellfish containing products Other (See Comments)     Closing of Throat. No Shellfish of any kind. Can't eat certain kinds of Regular Fish, ie, can't eat: Tuna, Swordfish, [place]=} these are the major fishes she can't eat.      Valium [diazepam] Hives and Itching    Vicodin [hydrocodone-acetaminophen] Itching, Nausea Only and Rash    Codeine Itching, Nausea Only and Rash    Demerol [meperidine] Itching and Rash     HPI Comments: CC: Ankle Pain    HPI: 72 year old female with a PMHx of asthma, HTN, fibromyalgia, CAD, osteoporosis, arthritis, COPD and a pacemaker presents to the ED c/o severe (8/10) right ankle pain with associated swelling after a fall last week. Patient reports she stepped out of a van and tumbled and the swelling and pain has gradually worsened. Patient  states she is experiencing pain in her entire right leg, but specifically the medial right ankle. Patient reports she can walk. No reported prior treatment or alleviating factors. Patient denies head trauma from the fall and other associated symptoms.             The history is provided by the patient. No  was used.     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    Encounter for blood transfusion     Extravasation injury of IV catheter site with other complication 2008     Pain remains to left forearm, no IV sticks    Fibromyalgia     Glaucoma     Hypertension     Irritable bowel syndrome (IBS)     Osteoporosis 8/7/2015    Pacemaker     Thyroid disease     Tumor of optic nerve      Past Medical History Pertinent Negatives   Diagnosis Date Noted    Amblyopia 11/4/2015    Cataract 11/4/2015    Diabetes mellitus 11/29/2016    Diabetic retinopathy 11/4/2015    Macular degeneration 11/4/2015    Retinal detachment 11/4/2015    Sickle cell anemia 11/4/2015    Sickle cell trait 11/4/2015    Strabismus 11/4/2015    Transfusion reaction 10/7/2016    Uveitis 11/4/2015     Past Surgical History   Procedure Laterality Date    Hysterectomy      Abdominal surgery      Hernia repair      Cardiac surgery       stents and pacemaker    Small intestine surgery      Colonoscopy N/A 9/19/2016     Procedure: COLONOSCOPY;  Surgeon: Lisandro Kaba MD;  Location: [facility];  Service: Endoscopy;  Laterality: N/A;  OUT PATIENT [facility]/CAN'T DO PROCEDURE AT [facility], HAS TO COME HERE    Tracheostomy tube placement       pt kept x 8.5 years. Removed during 2009     Family History   Problem Relation Age of Onset    Cataracts Brother     Glaucoma Brother     Glaucoma Mother     Lung cancer Mother     Coronary artery disease Mother     Coronary artery  disease Father     Cataracts Sister     Glaucoma Sister     No Known Problems Maternal Aunt     No Known Problems Maternal Uncle     No Known Problems Paternal Aunt     No Known Problems Paternal Uncle     No Known Problems Maternal Grandmother     No Known Problems Maternal Grandfather     No Known Problems Paternal Grandmother     No Known Problems Paternal Grandfather     Colon cancer Neg Hx     Colon polyps Neg Hx     Amblyopia Neg Hx     Blindness Neg Hx     Cancer Neg Hx     Diabetes Neg Hx     Hypertension Neg Hx     Macular degeneration Neg Hx     Retinal detachment Neg Hx     Strabismus Neg Hx     Stroke Neg Hx     Thyroid disease Neg Hx      Social History   Substance Use Topics    Smoking status: Former Smoker     Packs/day: 1.00     Years: 25.00     Types: Cigarettes     Quit date: 11/9/1985    Smokeless tobacco: Never Used    Alcohol use No     Review of Systems   Constitutional: Negative for fever.   HENT: Negative for rhinorrhea.    Eyes: Negative for pain.   Respiratory: Negative for shortness of breath.    Cardiovascular: Negative for chest pain.   Gastrointestinal: Negative for diarrhea, nausea and vomiting.   Genitourinary: Negative for dysuria.   Musculoskeletal: Negative for neck pain.        (+) Right ankle pain  (+) Right ankle swelling   Skin: Negative for rash.   Neurological: Negative for headaches.   All other systems negative.      Physical Exam   Initial Vitals   BP Pulse Resp Temp SpO2   01/24/17 0816 01/24/17 0816 01/24/17 0816 01/24/17 0816 01/24/17 0816   153/81 70 17 97.9 °F (36.6 °C) 98 %     Physical Exam  Nursing note and vitals reviewed.  Constitutional: Nontoxic female in apparent discomfort.  HENT:    Head: NC/AT    Eyes: Conjunctivae normal.   (-) scleral icterus.              Mouth/Throat: MMM   Neck: Neck supple, normal rom.  Musculoskeletal:   generalized nonspecific tenderness to the right ankle and knee.  No obvious swelling or deformity.  FROM  of all major joints.  N/v intact.    Neurological: A&Ox4, No focal motor deficits.  Normal gait  Skin: Skin is intact, warm and dry.   Psychiatric: normal mood and affect.      ED Course   Procedures  Labs Reviewed - No data to display       X-Rays:   Independently Interpreted Readings:   Other Readings:  X-ray right ankle - no acute fracture/dislocation.    X-ray right knee - no acute fracture    Medical Decision Making:   History:   I obtained history from: someone other than patient.  Old Medical Records: I decided to obtain old medical records.  Old Records Summarized: other records.  Independently Interpreted Test(s):   I have ordered and independently interpreted X-rays - see prior notes.  Clinical Tests:   Lab Tests: Reviewed  Radiological Study: Ordered and Reviewed    Differential diagnosis:   Initial differential diagnosis includes but is not limited to... Contusion, sprain, ligamentous injury, fracture    Additional Medical Decision Making:   Urgent evaluation of a 72-year-old female presents the emergency department complaining of right leg pain status post trip and fall roughly one week ago.  She denies any head injury, headache, change in vision, nausea/vomiting, or dizziness.  On evaluation, she has generalized nonspecific tenderness to her right anterior lateral knee and right ankle without obvious deformity.  Right lower extremity neurovascularly intact.  No visible trauma noted.  She is able to ambulate without difficulty.  X-rays of the right ankle and knee without acute fracture and/or dislocation.  Findings at this time are most consistent with a sprain vs soft tissue contusion.            Scribe Attestation:   Scribe #1: I performed the above scribed service and the documentation accurately describes the services I performed. I attest to the accuracy of the note.    Attending Attestation:           Physician Attestation for Scribe:  Physician Attestation Statement for Scribe #1: Chauncey DELGADO  MD Kyleigh, reviewed documentation, as scribed by Melinda Garza in my presence, and it is both accurate and complete.                 ED Course     Clinical Impression:   The primary encounter diagnosis was Fall, initial encounter. Diagnoses of Acute traumatic pain, Acute right ankle pain, and Acute pain of right knee were also pertinent to this visit.    Disposition:   Disposition: Discharged       Chauncey Arizmendi MD  01/24/17 2831

## 2017-01-26 ENCOUNTER — OFFICE VISIT (OUTPATIENT)
Dept: FAMILY MEDICINE | Facility: CLINIC | Age: 72
End: 2017-01-26
Payer: MEDICARE

## 2017-01-26 VITALS
SYSTOLIC BLOOD PRESSURE: 140 MMHG | OXYGEN SATURATION: 97 % | HEART RATE: 66 BPM | BODY MASS INDEX: 35.57 KG/M2 | WEIGHT: 181.19 LBS | DIASTOLIC BLOOD PRESSURE: 94 MMHG | TEMPERATURE: 98 F | HEIGHT: 60 IN

## 2017-01-26 DIAGNOSIS — K58.0 IRRITABLE BOWEL SYNDROME WITH DIARRHEA: ICD-10-CM

## 2017-01-26 DIAGNOSIS — R51.9 CHRONIC INTRACTABLE HEADACHE, UNSPECIFIED HEADACHE TYPE: ICD-10-CM

## 2017-01-26 DIAGNOSIS — D44.4 CRANIOPHARYNGIOMA: Primary | ICD-10-CM

## 2017-01-26 DIAGNOSIS — R22.0 MASS OF HEAD: ICD-10-CM

## 2017-01-26 DIAGNOSIS — G89.29 CHRONIC INTRACTABLE HEADACHE, UNSPECIFIED HEADACHE TYPE: ICD-10-CM

## 2017-01-26 PROCEDURE — 99213 OFFICE O/P EST LOW 20 MIN: CPT | Mod: PBBFAC,PO | Performed by: FAMILY MEDICINE

## 2017-01-26 PROCEDURE — 99999 PR PBB SHADOW E&M-EST. PATIENT-LVL III: CPT | Mod: PBBFAC,,, | Performed by: FAMILY MEDICINE

## 2017-01-26 PROCEDURE — 99215 OFFICE O/P EST HI 40 MIN: CPT | Mod: S$PBB,,, | Performed by: FAMILY MEDICINE

## 2017-01-26 RX ORDER — FENTANYL 25 UG/1
1 PATCH TRANSDERMAL
Qty: 5 PATCH | Refills: 0 | Status: SHIPPED | OUTPATIENT
Start: 2017-01-26 | End: 2017-05-15

## 2017-01-26 RX ORDER — HYDROCORTISONE 10 MG/1
10 TABLET ORAL 2 TIMES DAILY
Qty: 60 TABLET | Refills: 6 | Status: SHIPPED | OUTPATIENT
Start: 2017-01-26

## 2017-01-26 RX ORDER — LEVOTHYROXINE SODIUM 125 UG/1
125 TABLET ORAL DAILY
Qty: 90 TABLET | Refills: 0 | Status: SHIPPED | OUTPATIENT
Start: 2017-01-26 | End: 2017-02-03

## 2017-01-26 NOTE — PROGRESS NOTES
Office Visit    Patient Name: Olga Smith    : 1945  MRN: [id number]    Subjective:  Olga is a 72 y.o. female who presents today for:    Hospital Follow Up (ER follow up on test results) and Fall      This patient has multiple medical diagnoses as noted below.  This patient is known to me and to this clinic. She fell while it was raining.  She could not walk after the fall.  She used fentanyl patch which did help with the pain.  She reports that the pain starts in the ankle and radiates up to the right hip.  She had previous broke her ankle in 3 places in the past.  She was evaluated in the ER. She did have associated difficulty breathing.  She will be gasping for breath.  It previous occurred at night, but now is occurring during the day.  She will have to grab the wall because of difficulty breathing.  She is scheduled to meet with cardiology.  She will use her inhaler during times of difficulty breathing, but it does not help with her SOB.  Her difficulty breathing will five to ten minutes.  It can last longer.  She may lay down and sleep for an hour.  This may improve her symptoms, but these symptoms may return.    She reports that her headaches are severe.  She has used 3 tramadols.  She reports that it will ease the pain, but it is still there.        Active Problem List  Patient Active Problem List   Diagnosis    COPD (chronic obstructive pulmonary disease)    Essential hypertension    Fibromyalgia    Irritable bowel syndrome (IBS)    Glaucoma    Coronary artery disease    Osteoporosis    Craniopharyngioma    History of tracheostomy    Miguel-Stieda syndrome    Laryngeal stenosis    Tracheal stenosis    Bilateral complete vocal fold paralysis    Gastroesophageal reflux disease    History of non-ST elevation myocardial infarction (NSTEMI)    Encounter for long-term (current) use of medications    SVC obstruction    Central pain syndrome    Complication of vascular device     COLBY (obstructive sleep apnea)    Chronic pulmonary heart disease    Pure hypercholesterolemia    History of partial colectomy       Past Surgical History  Past Surgical History   Procedure Laterality Date    Hysterectomy      Abdominal surgery      Hernia repair      Cardiac surgery       stents and pacemaker    Small intestine surgery      Colonoscopy N/A 9/19/2016     Procedure: COLONOSCOPY;  Surgeon: Lisandro Kaba MD;  Location: [facility];  Service: Endoscopy;  Laterality: N/A;  OUT PATIENT [facility]/CAN'T DO PROCEDURE AT [facility], HAS TO COME HERE    Tracheostomy tube placement       pt kept x 8.5 years. Removed during 2009       Family History  Family History   Problem Relation Age of Onset    Cataracts Brother     Glaucoma Brother     Glaucoma Mother     Lung cancer Mother     Coronary artery disease Mother     Coronary artery disease Father     Cataracts Sister     Glaucoma Sister     No Known Problems Maternal Aunt     No Known Problems Maternal Uncle     No Known Problems Paternal Aunt     No Known Problems Paternal Uncle     No Known Problems Maternal Grandmother     No Known Problems Maternal Grandfather     No Known Problems Paternal Grandmother     No Known Problems Paternal Grandfather     Colon cancer Neg Hx     Colon polyps Neg Hx     Amblyopia Neg Hx     Blindness Neg Hx     Cancer Neg Hx     Diabetes Neg Hx     Hypertension Neg Hx     Macular degeneration Neg Hx     Retinal detachment Neg Hx     Strabismus Neg Hx     Stroke Neg Hx     Thyroid disease Neg Hx        Social History  Social History     Social History    Marital status:      Spouse name: N/A    Number of children: N/A    Years of education: N/A     Occupational History    Not on file.     Social History Main Topics    Smoking status: Former Smoker     Packs/day: 1.00     Years: 25.00     Types: Cigarettes     Quit date: 11/9/1985    Smokeless tobacco: Never Used    Alcohol use No     Drug use: No    Sexual activity: Yes     Partners: Male     Other Topics Concern    Not on file     Social History Narrative       Current Medications  Medications reviewed and updated.     Allergies   Review of patient's allergies indicates:   Allergen Reactions    Aspirin Nausea Only    Dilaudid [hydromorphone (bulk)] Itching     Rash , swelling of throat.    Iodine and iodide containing products Other (See Comments)     Closing of Throat. No Shellfish of any kind. Can't eat certain kinds of Regular Fish, ie, can't eat: Tuna, Swordfish, Delphia=} these are the major fishes she can't eat.    Morphine Itching, Nausea And Vomiting and Hallucinations    Oxycodone Itching and Swelling     Swelling of Throat.    Penicillins Itching and Rash    Percocet [oxycodone-acetaminophen] Itching and Swelling     Cannot tolerate this medication. Swelling of Throat.    Shellfish containing products Other (See Comments)     Closing of Throat. No Shellfish of any kind. Can't eat certain kinds of Regular Fish, ie, can't eat: Tuna, Swordfish, Delphia=} these are the major fishes she can't eat.      Valium [diazepam] Hives and Itching    Vicodin [hydrocodone-acetaminophen] Itching, Nausea Only and Rash    Codeine Itching, Nausea Only and Rash    Demerol [meperidine] Itching and Rash       Review of Systems (Pertinent positives)  Review of Systems   Constitutional: Negative for activity change, appetite change, fatigue, fever and unexpected weight change.   HENT: Negative for ear discharge, ear pain, rhinorrhea and sore throat.    Eyes: Negative.    Respiratory: Negative for apnea, cough, chest tightness, shortness of breath and wheezing.    Cardiovascular: Negative for chest pain, palpitations and leg swelling.   Gastrointestinal: Positive for constipation. Negative for abdominal distention, abdominal pain, diarrhea and vomiting.   Endocrine: Negative for cold intolerance, heat intolerance, polydipsia and polyuria.    Genitourinary: Negative for decreased urine volume, menstrual problem, urgency, vaginal bleeding, vaginal discharge and vaginal pain.   Musculoskeletal: Negative.    Skin: Negative for rash.   Neurological: Positive for headaches. Negative for dizziness.   Hematological: Does not bruise/bleed easily.   Psychiatric/Behavioral: Negative for agitation, sleep disturbance and suicidal ideas.       Visit Vitals    BP (!) 140/94 (BP Location: Right arm, Patient Position: Sitting, BP Method: Manual)    Pulse 66    Temp 98 °F (36.7 °C) (Oral)    Ht 5' (1.524 m)    Wt 82.2 kg (181 lb 3.5 oz)    SpO2 97%    BMI 35.39 kg/m2       Physical Exam   Constitutional: She is oriented to person, place, and time.   HENT:   Head: Normocephalic and atraumatic.   Right Ear: External ear normal.   Left Ear: External ear normal.   Mouth/Throat: Oropharynx is clear and moist.   Eyes: Conjunctivae and EOM are normal. Pupils are equal, round, and reactive to light.   Neck: Normal range of motion.   Cardiovascular: Normal rate, regular rhythm and normal heart sounds.    Pulmonary/Chest: Effort normal and breath sounds normal.   Abdominal: Soft. Bowel sounds are normal. There is tenderness.   Neurological: She is alert and oriented to person, place, and time.   Skin: Skin is warm.   Psychiatric: She has a normal mood and affect.   Vitals reviewed.      Health Maintenance  Health Maintenance Topics with due status: Not Due       Topic Last Completion Date    DEXA SCAN 08/13/2015    Lipid Panel 04/09/2016    Colonoscopy 09/19/2016       Assessment/Plan:  Olga Smith is a 72 y.o. female who presents today for :    Olga was seen today for hospital follow up and fall.    Diagnoses and all orders for this visit:    Craniopharyngioma  -   May be contributing to headaches at this time   Chronic intractable headache, unspecified headache type  -     Ambulatory consult to Neurology  -  Defer to neurology recommendations     Irritable  bowel syndrome with diarrhea  -     hydrocortisone (CORTEF) 10 MG Tab; Take 1 tablet (10 mg total) by mouth 2 (two) times daily.    Mass of head  -     US Soft Tissue Misc; Future    Other orders  -     levothyroxine (SYNTHROID) 125 MCG tablet; Take 1 tablet (125 mcg total) by mouth once daily.  -     fentaNYL (DURAGESIC) 25 mcg/hr; Place 1 patch onto the skin every 72 hours.      Greater than 45 minutes was spent with this patient with greater than 50% spent with face-to-face counseling      No Follow-up on file.

## 2017-02-01 ENCOUNTER — INFUSION (OUTPATIENT)
Dept: INFUSION THERAPY | Facility: HOSPITAL | Age: 72
End: 2017-02-01
Attending: FAMILY MEDICINE
Payer: MEDICARE

## 2017-02-01 DIAGNOSIS — E03.9 HYPOTHYROID: Primary | ICD-10-CM

## 2017-02-01 LAB
ANION GAP SERPL CALC-SCNC: 6 MMOL/L
BUN SERPL-MCNC: 14 MG/DL
CALCIUM SERPL-MCNC: 9.8 MG/DL
CHLORIDE SERPL-SCNC: 107 MMOL/L
CO2 SERPL-SCNC: 29 MMOL/L
CREAT SERPL-MCNC: 1 MG/DL
EST. GFR  (AFRICAN AMERICAN): >60 ML/MIN/1.73 M^2
EST. GFR  (NON AFRICAN AMERICAN): 57 ML/MIN/1.73 M^2
GLUCOSE SERPL-MCNC: 82 MG/DL
POTASSIUM SERPL-SCNC: 4.1 MMOL/L
SODIUM SERPL-SCNC: 142 MMOL/L
T4 FREE SERPL-MCNC: 1.36 NG/DL
TSH SERPL DL<=0.005 MIU/L-ACNC: <0.01 UIU/ML

## 2017-02-01 PROCEDURE — 96523 IRRIG DRUG DELIVERY DEVICE: CPT

## 2017-02-01 PROCEDURE — 84439 ASSAY OF FREE THYROXINE: CPT

## 2017-02-01 PROCEDURE — 25000003 PHARM REV CODE 250: Performed by: FAMILY MEDICINE

## 2017-02-01 PROCEDURE — 36591 DRAW BLOOD OFF VENOUS DEVICE: CPT

## 2017-02-01 PROCEDURE — 84443 ASSAY THYROID STIM HORMONE: CPT

## 2017-02-01 PROCEDURE — 80048 BASIC METABOLIC PNL TOTAL CA: CPT

## 2017-02-01 RX ORDER — HEPARIN 100 UNIT/ML
500 SYRINGE INTRAVENOUS
Status: COMPLETED | OUTPATIENT
Start: 2017-02-01 | End: 2017-02-01

## 2017-02-01 RX ORDER — SODIUM CHLORIDE 0.9 % (FLUSH) 0.9 %
10 SYRINGE (ML) INJECTION
Status: DISCONTINUED | OUTPATIENT
Start: 2017-02-01 | End: 2017-02-01 | Stop reason: HOSPADM

## 2017-02-01 RX ADMIN — HEPARIN 500 UNITS: 100 SYRINGE at 10:02

## 2017-02-02 ENCOUNTER — SURGERY (OUTPATIENT)
Age: 72
End: 2017-02-02

## 2017-02-02 ENCOUNTER — ANESTHESIA EVENT (OUTPATIENT)
Dept: ENDOSCOPY | Facility: HOSPITAL | Age: 72
End: 2017-02-02
Payer: MEDICARE

## 2017-02-02 ENCOUNTER — ANESTHESIA (OUTPATIENT)
Dept: ENDOSCOPY | Facility: HOSPITAL | Age: 72
End: 2017-02-02
Payer: MEDICARE

## 2017-02-02 VITALS — RESPIRATION RATE: 15 BRPM

## 2017-02-02 PROCEDURE — 25000003 PHARM REV CODE 250: Performed by: NURSE ANESTHETIST, CERTIFIED REGISTERED

## 2017-02-02 PROCEDURE — D9220A PRA ANESTHESIA: Mod: CRNA,,, | Performed by: NURSE ANESTHETIST, CERTIFIED REGISTERED

## 2017-02-02 PROCEDURE — D9220A PRA ANESTHESIA: Mod: ANES,,, | Performed by: ANESTHESIOLOGY

## 2017-02-02 PROCEDURE — 63600175 PHARM REV CODE 636 W HCPCS: Performed by: NURSE ANESTHETIST, CERTIFIED REGISTERED

## 2017-02-02 RX ORDER — PROPOFOL 10 MG/ML
VIAL (ML) INTRAVENOUS CONTINUOUS PRN
Status: DISCONTINUED | OUTPATIENT
Start: 2017-02-02 | End: 2017-02-02

## 2017-02-02 RX ORDER — LIDOCAINE HCL/PF 100 MG/5ML
SYRINGE (ML) INTRAVENOUS
Status: DISCONTINUED | OUTPATIENT
Start: 2017-02-02 | End: 2017-02-02

## 2017-02-02 RX ORDER — PROPOFOL 10 MG/ML
VIAL (ML) INTRAVENOUS
Status: DISCONTINUED | OUTPATIENT
Start: 2017-02-02 | End: 2017-02-02

## 2017-02-02 RX ADMIN — PROPOFOL 150 MCG/KG/MIN: 10 INJECTION, EMULSION INTRAVENOUS at 12:02

## 2017-02-02 RX ADMIN — LIDOCAINE HYDROCHLORIDE 100 MG: 20 INJECTION, SOLUTION INTRAVENOUS at 12:02

## 2017-02-02 RX ADMIN — PROPOFOL 100 MG: 10 INJECTION, EMULSION INTRAVENOUS at 12:02

## 2017-02-02 NOTE — TRANSFER OF CARE
Anesthesia Transfer of Care Note    Patient: Olga Smith    Procedure(s) Performed: Procedure(s) (LRB):  ESOPHAGOGASTRODUODENOSCOPY (EGD) (N/A)    Patient location: GI    Anesthesia Type: MAC    Transport from OR: Transported from OR on room air with adequate spontaneous ventilation    Post pain: adequate analgesia    Post assessment: no apparent anesthetic complications    Post vital signs: stable    Level of consciousness: awake, alert and oriented    Complications: none          Last vitals:   Visit Vitals    /68 (BP Location: Left arm, Patient Position: Lying, BP Method: Automatic)    Pulse 67    Temp 36.4 °C (97.5 °F) (Axillary)    Resp 14    Ht 5' (1.524 m)    Wt 81.6 kg (180 lb)    SpO2 98%    Breastfeeding No    BMI 35.15 kg/m2

## 2017-02-02 NOTE — ANESTHESIA POSTPROCEDURE EVALUATION
Anesthesia Post Evaluation    Patient: Olga Smith    Procedure(s) Performed: Procedure(s) (LRB):  ESOPHAGOGASTRODUODENOSCOPY (EGD) (N/A)    Final Anesthesia Type: general  Patient location during evaluation: PACU  Patient participation: Yes- Able to Participate  Level of consciousness: awake and alert  Post-procedure vital signs: reviewed and stable  Pain management: adequate  Airway patency: patent  PONV status at discharge: No PONV  Anesthetic complications: no      Cardiovascular status: hemodynamically stable and blood pressure returned to baseline  Respiratory status: unassisted and spontaneous ventilation  Hydration status: euvolemic  Follow-up not needed.        Visit Vitals    /75 (BP Location: Left arm, Patient Position: Lying, BP Method: Automatic)    Pulse 70    Temp 36.4 °C (97.5 °F) (Axillary)    Resp 18    Ht 5' (1.524 m)    Wt 81.6 kg (180 lb)    SpO2 100%    Breastfeeding No    BMI 35.15 kg/m2       Pain/Abdias Score: Pain Assessment Performed: Yes (2/2/2017 12:45 PM)  Presence of Pain: denies (2/2/2017 12:45 PM)  Abdias Score: 10 (2/2/2017 12:44 PM)

## 2017-02-02 NOTE — ANESTHESIA PREPROCEDURE EVALUATION
02/02/2017  Olga Smith is a 72 y.o., female.    [facility] Anesthesia Evaluation         Review of Systems  Cardiovascular:   Hypertension Past MI CAD     Pulmonary:   COPD Asthma Sleep Apnea Tracheal and laryngeal stenosis secondary to long term trachesotomy.   Hepatic/GI:   GERD        Physical Exam  General:  Well nourished, Obesity    Airway/Jaw/Neck:  Airway Findings: Mouth Opening: Normal Tongue: Normal  Mallampati: II  TM Distance: Normal, at least 6 cm  Jaw/Neck Findings:  Neck ROM: Normal ROM     Eyes/Ears/Nose:  Eyes/Ears/Nose Findings:    Dental:  Dental Findings:   Chest/Lungs:  Chest/Lungs Findings: Normal Respiratory Rate     Heart/Vascular:  Heart Findings: Rate: Normal  Rhythm: Regular Rhythm        Mental Status:  Mental Status Findings:  Cooperative, Alert and Oriented         Anesthesia Plan  Type of Anesthesia, risks & benefits discussed:  Anesthesia Type:  general  Patient's Preference: general  Intra-op Monitoring Plan:   Intra-op Monitoring Plan Comments:   Post Op Pain Control Plan:   Post Op Pain Control Plan Comments:   Induction:   IV  Beta Blocker:  Patient is on a Beta-Blocker and has received one dose within the past 24 hours (No further documentation required).       Informed Consent: Patient understands risks and agrees with Anesthesia plan.  Questions answered. Anesthesia consent signed with patient.  ASA Score: 3     Day of Surgery Review of History & Physical:    H&P update referred to the surgeon.         Ready For Surgery From Anesthesia Perspective.

## 2017-02-02 NOTE — ANESTHESIA RELEASE NOTE
Anesthesia Release from PACU Note    Anesthesia Release from PACU note    Patient: Olga Smith    Procedure(s) Performed: Procedure(s) (LRB):  ESOPHAGOGASTRODUODENOSCOPY (EGD) (N/A)    Anesthesia type: general    Post pain: Adequate analgesia    Post assessment: no apparent anesthetic complications, tolerated procedure well and no evidence of recall    Last Vitals:   Vitals:    02/02/17 1244   BP: 126/75   Pulse: 70   Resp: 18   Temp:    SpO2: 100%       Post vital signs: stable    Level of consciousness: awake, alert  and oriented    Nausea/Vomiting: no nausea/no vomiting    Complications: none    Airway Patency: patent    Respiratory: unassisted    Cardiovascular: stable and blood pressure at baseline    Hydration: euvolemic

## 2017-02-03 RX ORDER — LEVOTHYROXINE SODIUM 112 UG/1
112 TABLET ORAL DAILY
Qty: 90 TABLET | Refills: 3 | Status: SHIPPED | OUTPATIENT
Start: 2017-02-03 | End: 2018-02-03

## 2017-02-08 ENCOUNTER — OFFICE VISIT (OUTPATIENT)
Dept: CARDIOLOGY | Facility: CLINIC | Age: 72
End: 2017-02-08
Payer: MEDICARE

## 2017-02-08 VITALS
HEART RATE: 66 BPM | HEIGHT: 60 IN | BODY MASS INDEX: 34.95 KG/M2 | DIASTOLIC BLOOD PRESSURE: 67 MMHG | SYSTOLIC BLOOD PRESSURE: 133 MMHG | WEIGHT: 178 LBS | OXYGEN SATURATION: 99 %

## 2017-02-08 DIAGNOSIS — M79.7 FIBROMYALGIA: ICD-10-CM

## 2017-02-08 DIAGNOSIS — I10 ESSENTIAL HYPERTENSION: ICD-10-CM

## 2017-02-08 DIAGNOSIS — K58.9 IRRITABLE BOWEL SYNDROME WITHOUT DIARRHEA: ICD-10-CM

## 2017-02-08 DIAGNOSIS — D44.4 CRANIOPHARYNGIOMA: ICD-10-CM

## 2017-02-08 DIAGNOSIS — J43.9 PULMONARY EMPHYSEMA, UNSPECIFIED EMPHYSEMA TYPE: ICD-10-CM

## 2017-02-08 DIAGNOSIS — E78.5 DYSLIPIDEMIA: ICD-10-CM

## 2017-02-08 DIAGNOSIS — I25.10 CORONARY ARTERY DISEASE INVOLVING NATIVE CORONARY ARTERY OF NATIVE HEART WITHOUT ANGINA PECTORIS: Primary | ICD-10-CM

## 2017-02-08 PROCEDURE — 99999 PR PBB SHADOW E&M-EST. PATIENT-LVL III: CPT | Mod: PBBFAC,,, | Performed by: INTERNAL MEDICINE

## 2017-02-08 PROCEDURE — 99213 OFFICE O/P EST LOW 20 MIN: CPT | Mod: PBBFAC | Performed by: INTERNAL MEDICINE

## 2017-02-08 PROCEDURE — 99214 OFFICE O/P EST MOD 30 MIN: CPT | Mod: S$PBB,,, | Performed by: INTERNAL MEDICINE

## 2017-02-08 RX ORDER — PREDNISONE 20 MG/1
TABLET ORAL
COMMUNITY
Start: 2017-01-18 | End: 2017-02-08

## 2017-02-08 NOTE — PROGRESS NOTES
Subjective:    Patient ID:  Olga Smith is a 72 y.o. female who presents for follow-up of Coronary Artery Disease      Coronary Artery Disease    previous interval history:  Here for follow-up of coronary artery disease.  She underwent relook angiography to assess for stent patency.  Previously placed stents were flowing well without any issues.  Postoperatively she has some mild bruising at the right radial access site.  She has some discomfort.  She denies any worsening cardiopulmonary complaints but still has some substernal chest pressure which causes are also associated worsening breathing at times.  She doesn't follow-up with pulmonary this afternoon.  She also has multiple follow-ups with different subspecialties including neuro and hematology/oncology.  She was scheduled for EGD which has not been done.  We are trying to arrange for a follow-up for her.    Today:  Here for follow-up of coronary artery disease.  She's had no progression of cardiopulmonary complaints.  She says she wants to try and come off of her isosorbide due to possible headache.  She is okay to continue the Ranexa.  We discussed that she is okay to be off of Effient for procedures at this point.  Would resume when okay from other specialists standpoint post procedure.  She's not expressing any chest pain, shortness of breath or palpitations that are worsened.  She does have some baseline shortness of breath but relieved with rest.  She does not exercise regularly we discussed that this would benefit her and her  immensely.  She mainly deals with a lot of stress and has a contentious relationship with her  while at home.  She denies any PND, orthopnea or lower edema.  She's not expressing dizziness, presyncope or syncope.  Early is undergoing workup through GI which she was told was normal.    Review of Systems   Constitution: Negative.   HENT: Negative.    Eyes: Negative.    Cardiovascular: Negative for dyspnea on  exertion, irregular heartbeat, near-syncope, orthopnea, paroxysmal nocturnal dyspnea and syncope.   Musculoskeletal: Negative.    Gastrointestinal: Positive for abdominal pain, constipation and diarrhea.   Genitourinary: Positive for frequency and urgency. Negative for dysuria.   Neurological: Negative.    Psychiatric/Behavioral: Positive for depression.        Objective:    Physical Exam   Constitutional: She is oriented to person, place, and time. She appears well-developed and well-nourished.   HENT:   Head: Normocephalic and atraumatic.   Eyes: Conjunctivae and EOM are normal. Pupils are equal, round, and reactive to light.   Neck: Normal range of motion. Neck supple. No thyromegaly present.   Cardiovascular: Normal rate and regular rhythm.    No murmur heard.  Pulmonary/Chest: Effort normal. No respiratory distress.   Decreased breath sounds bilaterally   Abdominal: Soft. Bowel sounds are normal.   Musculoskeletal: She exhibits no edema.   Neurological: She is alert and oriented to person, place, and time.   Skin: Skin is warm and dry.   Psychiatric: She has a normal mood and affect. Her behavior is normal.       LHC:         Patient has a right dominant coronary artery.        - Left Main Coronary Artery:             The LM is normal. There is SALVATORE 3 flow.     - Left Anterior Descending Artery:             The LAD has luminal irregularities. There is SALVATORE 3 flow.     - Left Circumflex Artery:             The LCX has luminal irregularities. There is SALVATORE 3 flow.     - Right Coronary Artery:             The RCA was not studied.    Echo:  CONCLUSIONS     1 - Normal left ventricular systolic function (EF 60-65%).  Normal wall motion.     2 - Concentric remodeling.     3 - Trivial tricuspid regurgitation.     4 - The estimated PA systolic pressure is 40 mmHg.       Assessment:       1. Coronary artery disease involving native coronary artery of native heart without angina pectoris    2. Essential hypertension    3.  Pulmonary emphysema, unspecified emphysema type    4. Fibromyalgia    5. Dyslipidemia    6. Craniopharyngioma    7. Irritable bowel syndrome without diarrhea         Plan:       -atypical symptoms which may be related to multiple organ systems that she has problems with or anxiety driven  -follow-up with GI, pulm, neuro and oncology  -Newly reassurance from CV standpoint with recent cardiac catheterization  -Check labs mainly including lipid profile  -Ok DC isosorbide with headache but continue Ranexa    Return to clinic 6 months

## 2017-02-08 NOTE — MR AVS SNAPSHOT
[facility] - Cardiology  [address] [place] [address]  Phone: [phone]                  Olga Smith   2017 8:40 AM   Office Visit    Description:  Female : 1945   Provider:  Bandar Valladares MD   Department:  [facility] - Cardiology           Reason for Visit     Coronary Artery Disease                To Do List           Future Appointments        Provider Department Dept Phone    3/1/2017 11:00 AM CHAIR 07 [facility] [phone]    2017 3:00 PM MD Gonzalo Wasserman [facility] - Gastroenterology [phone]    2017 10:15 AM PERIMETRY, HUMPH Gonzalo [facility] Ophthalmology [phone]    2017 10:45 AM MD Gonzalo Spicer [facility] Ophthalmology [phone]    2017 10:30 AM Ariel Tian II, MD [facility] - Neurology [phone]      Goals (5 Years of Data)              Today    17    Blood Pressure < 140/90   133/67  161/92  126/75      [facility] -  Assistance  Registered nurses in the [facility] provide clinical advisement, health education, appointment booking, and other advisory services.  Call for this free service at [phone].             Medications           Message regarding Medications     Verify the changes and/or additions to your medication regime listed below are the same as discussed with your clinician today.  If any of these changes or additions are incorrect, please notify your healthcare provider.        STOP taking these medications     predniSONE (DELTASONE) 20 MG tablet            Verify that the below list of medications is an accurate representation of the medications you are currently taking.  If none reported, the list may be blank. If incorrect, please contact your healthcare provider. Carry this list with you in case of emergency.           Current Medications     albuterol (PROAIR HFA) 90 mcg/actuation inhaler Inhale 2 puffs  into the lungs every 6 (six) hours as needed for Wheezing.    alendronate (FOSAMAX) 70 MG tablet Take 1 tablet (70 mg total) by mouth every 7 days.    aspirin (ECOTRIN) 81 MG EC tablet Take 1 tablet (81 mg total) by mouth once daily.    atorvastatin (LIPITOR) 80 MG tablet Take 1 tablet (80 mg total) by mouth once daily.    baclofen (LIORESAL) 10 MG tablet Take 1 tablet (10 mg total) by mouth 3 (three) times daily. Take half (5mg)  tablet three times a day    bimatoprost (LUMIGAN) 0.01 % Drop Place 1 drop into both eyes every evening.    calcium carbonate-vitamin D3 (CALTRATE 600 + D) 600 mg (1,500 mg)-800 unit Chew Take 2 tablets by mouth 2 (two) times daily. Pt. Takes= 1.5 tabs every AM & 1 TAB Every evening.    colesevelam (WELCHOL) 625 mg tablet Take 3 tablets (1,875 mg total) by mouth every evening.    cyclobenzaprine (FLEXERIL) 5 MG tablet Take 1 tablet (5 mg total) by mouth 3 (three) times daily as needed for Muscle spasms.    dicyclomine (BENTYL) 10 MG capsule Take 1 capsule (10 mg total) by mouth 4 (four) times daily before meals and nightly.    diphenhydrAMINE (BENADRYL) 25 mg capsule Take 1 each (25 mg total) by mouth nightly as needed for Itching.    fentaNYL (DURAGESIC) 25 mcg/hr Place 1 patch onto the skin every 72 hours.    hydrocortisone (CORTEF) 10 MG Tab Take 1 tablet (10 mg total) by mouth 2 (two) times daily.    isosorbide mononitrate (IMDUR) 30 MG 24 hr tablet Take 1 tablet (30 mg total) by mouth once daily.    levothyroxine (SYNTHROID) 112 MCG tablet Take 1 tablet (112 mcg total) by mouth once daily.    lidocaine (XYLOCAINE) 5 % Oint ointment Apply topically as needed.    metoprolol tartrate (LOPRESSOR) 50 MG tablet Take 1 tablet (50 mg total) by mouth 2 (two) times daily.    montelukast (SINGULAIR) 10 mg tablet Take 10 mg by mouth every evening.    nitroGLYCERIN (NITROSTAT) 0.4 MG SL tablet Place 1 tablet (0.4 mg total) under the tongue every 5 (five) minutes as needed for Chest pain.     pantoprazole (PROTONIX) 40 MG tablet Take 1 tablet (40 mg total) by mouth 2 (two) times daily.    potassium chloride (KLOR-CON) 10 MEQ TbSR Take 1 tablet (10 mEq total) by mouth once daily.    prasugrel (EFFIENT) 10 mg Tab Take 1 tablet (10 mg total) by mouth once daily.    promethazine (PHENERGAN) 25 MG tablet Take 1 tablet (25 mg total) by mouth every 6 (six) hours as needed for Nausea.    RANEXA 500 mg Tb12     ranolazine (RANEXA) 1,000 mg Tb12 Take 1 tablet (1,000 mg total) by mouth 2 (two) times daily.    sertraline (ZOLOFT) 50 MG tablet Take 1 tablet (50 mg total) by mouth once daily.           Clinical Reference Information           Your Vitals Were     BP Pulse Height Weight SpO2 BMI    133/67 (BP Location: Left arm, Patient Position: Sitting) 66 5' (1.524 m) 80.7 kg (178 lb) 99% 34.76 kg/m2      Blood Pressure          Most Recent Value    BP  133/67      Allergies as of 2/8/2017     Aspirin    Dilaudid [Hydromorphone (Bulk)]    Iodine And Iodide Containing Products    Morphine    Oxycodone    Penicillins    Percocet [Oxycodone-acetaminophen]    Shellfish Containing Products    Valium [Diazepam]    Vicodin [Hydrocodone-acetaminophen]    Codeine    Demerol [Meperidine]      Immunizations Administered on Date of Encounter - 2/8/2017     None      Language Assistance Services     ATTENTION: Language assistance services are available, free of charge. Please call [phone].      ATENCIÓN: Si habla normanañol, tiene a avilez disposición servicios gratuitos de asistencia lingüística. Llame al [phone].     CHÚ Ý: N?u b?n nói Ti?ng Vi?t, có các d?ch v? h? tr? ngôn ng? mi?n phí dành cho b?n. G?i s? [phone].         [facility] complies with applicable Federal civil rights laws and does not discriminate on the basis of race, color, national origin, age, disability, or sex.

## 2017-02-09 ENCOUNTER — TELEPHONE (OUTPATIENT)
Dept: ENDOSCOPY | Facility: HOSPITAL | Age: 72
End: 2017-02-09

## 2017-02-13 ENCOUNTER — TELEPHONE (OUTPATIENT)
Dept: GASTROENTEROLOGY | Facility: CLINIC | Age: 72
End: 2017-02-13

## 2017-02-13 NOTE — TELEPHONE ENCOUNTER
----- Message from Thang Marsh MD sent at 2/8/2017 10:49 AM CST -----  Please notify patient of EGD biopsy results:  H pylori is negative.  Small bowel is normal on pathology  Has some chronic gastritis or inflammatory changes. No evidence of atypical or cancer cells or acute / active changes.  Thanks RAJESH    Supplemental Diagnosis  Immunostain for Helicobacter pylori organisms, performed with appropriate controls, is negative.    1. DUODENUM, BIOPSY:  Duodenal mucosa with no significant pathologic abnormality  Villous architecture is maintained  2. STOMACH, BIOPSY:  Gastric body and antral mucosa with chronic gastritis and reactive changes  No evidence of intestinal metaplasia, dysplasia or malignancy  No evidence of Helicobacter pylori organisms on H&E stain  Immunostain for Helicobacter pylori organisms has been requested and will be reported as a supplemental report

## 2017-02-13 NOTE — TELEPHONE ENCOUNTER
Attempted to contact patient, no answer. Patient does not have a voicemail set up. Will attempt to contact patient at later time.

## 2017-02-27 ENCOUNTER — HOSPITAL ENCOUNTER (EMERGENCY)
Facility: HOSPITAL | Age: 72
Discharge: HOME | End: 2017-02-27
Attending: EMERGENCY MEDICINE
Payer: MEDICARE

## 2017-02-27 VITALS
DIASTOLIC BLOOD PRESSURE: 79 MMHG | TEMPERATURE: 98 F | SYSTOLIC BLOOD PRESSURE: 169 MMHG | HEIGHT: 60 IN | WEIGHT: 182 LBS | RESPIRATION RATE: 20 BRPM | HEART RATE: 64 BPM | BODY MASS INDEX: 35.73 KG/M2 | OXYGEN SATURATION: 98 %

## 2017-02-27 DIAGNOSIS — R07.9 ACUTE CHEST PAIN: Primary | ICD-10-CM

## 2017-02-27 DIAGNOSIS — R07.89 LEFT-SIDED CHEST WALL PAIN: ICD-10-CM

## 2017-02-27 LAB
ALBUMIN SERPL BCP-MCNC: 3.5 G/DL
ALP SERPL-CCNC: 75 U/L
ALT SERPL W/O P-5'-P-CCNC: 13 U/L
ANION GAP SERPL CALC-SCNC: 7 MMOL/L
APTT BLDCRRT: 35.8 SEC
AST SERPL-CCNC: 12 U/L
BASOPHILS # BLD AUTO: 0.03 K/UL
BASOPHILS NFR BLD: 0.2 %
BILIRUB SERPL-MCNC: 0.3 MG/DL
BILIRUB UR QL STRIP: NEGATIVE
BNP SERPL-MCNC: 231 PG/ML
BUN SERPL-MCNC: 14 MG/DL
CALCIUM SERPL-MCNC: 9.6 MG/DL
CHLORIDE SERPL-SCNC: 110 MMOL/L
CLARITY UR: CLEAR
CO2 SERPL-SCNC: 27 MMOL/L
COLOR UR: ABNORMAL
CREAT SERPL-MCNC: 0.9 MG/DL
DIFFERENTIAL METHOD: ABNORMAL
EOSINOPHIL # BLD AUTO: 0.4 K/UL
EOSINOPHIL NFR BLD: 2.7 %
ERYTHROCYTE [DISTWIDTH] IN BLOOD BY AUTOMATED COUNT: 15 %
EST. GFR  (AFRICAN AMERICAN): >60 ML/MIN/1.73 M^2
EST. GFR  (NON AFRICAN AMERICAN): >60 ML/MIN/1.73 M^2
GLUCOSE SERPL-MCNC: 87 MG/DL
GLUCOSE UR QL STRIP: NEGATIVE
HCT VFR BLD AUTO: 31.4 %
HGB BLD-MCNC: 9.8 G/DL
HGB UR QL STRIP: NEGATIVE
INR PPP: 1.1
KETONES UR QL STRIP: NEGATIVE
LEUKOCYTE ESTERASE UR QL STRIP: ABNORMAL
LYMPHOCYTES # BLD AUTO: 3.6 K/UL
LYMPHOCYTES NFR BLD: 27.5 %
MAGNESIUM SERPL-MCNC: 1.8 MG/DL
MCH RBC QN AUTO: 28.1 PG
MCHC RBC AUTO-ENTMCNC: 31.2 %
MCV RBC AUTO: 90 FL
MICROSCOPIC COMMENT: NORMAL
MONOCYTES # BLD AUTO: 1.1 K/UL
MONOCYTES NFR BLD: 8.2 %
NEUTROPHILS # BLD AUTO: 8.1 K/UL
NEUTROPHILS NFR BLD: 61.2 %
NITRITE UR QL STRIP: NEGATIVE
PH UR STRIP: 6 [PH] (ref 5–8)
PLATELET # BLD AUTO: 339 K/UL
PMV BLD AUTO: 10.1 FL
POTASSIUM SERPL-SCNC: 3.6 MMOL/L
PROT SERPL-MCNC: 6.4 G/DL
PROT UR QL STRIP: NEGATIVE
PROTHROMBIN TIME: 11.3 SEC
RBC # BLD AUTO: 3.49 M/UL
SODIUM SERPL-SCNC: 144 MMOL/L
SP GR UR STRIP: 1 (ref 1–1.03)
T4 FREE SERPL-MCNC: 1.81 NG/DL
TROPONIN I SERPL DL<=0.01 NG/ML-MCNC: <0.006 NG/ML
TSH SERPL DL<=0.005 MIU/L-ACNC: <0.01 UIU/ML
URN SPEC COLLECT METH UR: ABNORMAL
UROBILINOGEN UR STRIP-ACNC: NEGATIVE EU/DL
WBC # BLD AUTO: 13.16 K/UL
WBC #/AREA URNS HPF: 3 /HPF (ref 0–5)

## 2017-02-27 PROCEDURE — 84443 ASSAY THYROID STIM HORMONE: CPT

## 2017-02-27 PROCEDURE — 96375 TX/PRO/DX INJ NEW DRUG ADDON: CPT

## 2017-02-27 PROCEDURE — 85610 PROTHROMBIN TIME: CPT

## 2017-02-27 PROCEDURE — 80053 COMPREHEN METABOLIC PANEL: CPT

## 2017-02-27 PROCEDURE — 25000003 PHARM REV CODE 250: Performed by: EMERGENCY MEDICINE

## 2017-02-27 PROCEDURE — 85025 COMPLETE CBC W/AUTO DIFF WBC: CPT

## 2017-02-27 PROCEDURE — 83880 ASSAY OF NATRIURETIC PEPTIDE: CPT

## 2017-02-27 PROCEDURE — 84484 ASSAY OF TROPONIN QUANT: CPT

## 2017-02-27 PROCEDURE — 96374 THER/PROPH/DIAG INJ IV PUSH: CPT

## 2017-02-27 PROCEDURE — 83735 ASSAY OF MAGNESIUM: CPT

## 2017-02-27 PROCEDURE — 99285 EMERGENCY DEPT VISIT HI MDM: CPT | Mod: 25

## 2017-02-27 PROCEDURE — 81000 URINALYSIS NONAUTO W/SCOPE: CPT

## 2017-02-27 PROCEDURE — 85730 THROMBOPLASTIN TIME PARTIAL: CPT

## 2017-02-27 PROCEDURE — 84439 ASSAY OF FREE THYROXINE: CPT

## 2017-02-27 PROCEDURE — 63600175 PHARM REV CODE 636 W HCPCS: Performed by: EMERGENCY MEDICINE

## 2017-02-27 RX ORDER — TRAMADOL HYDROCHLORIDE 50 MG/1
50 TABLET ORAL
Status: COMPLETED | OUTPATIENT
Start: 2017-02-27 | End: 2017-02-27

## 2017-02-27 RX ORDER — HEPARIN 100 UNIT/ML
500 SYRINGE INTRAVENOUS
Status: COMPLETED | OUTPATIENT
Start: 2017-02-27 | End: 2017-02-27

## 2017-02-27 RX ORDER — DIPHENHYDRAMINE HYDROCHLORIDE 50 MG/ML
25 INJECTION INTRAMUSCULAR; INTRAVENOUS
Status: COMPLETED | OUTPATIENT
Start: 2017-02-27 | End: 2017-02-27

## 2017-02-27 RX ORDER — TRAMADOL HYDROCHLORIDE 50 MG/1
50 TABLET ORAL EVERY 6 HOURS PRN
Qty: 12 TABLET | Refills: 0 | Status: SHIPPED | OUTPATIENT
Start: 2017-02-27 | End: 2017-03-09

## 2017-02-27 RX ORDER — NITROGLYCERIN 0.4 MG/1
0.4 TABLET SUBLINGUAL EVERY 5 MIN PRN
Status: DISCONTINUED | OUTPATIENT
Start: 2017-02-27 | End: 2017-02-27 | Stop reason: HOSPADM

## 2017-02-27 RX ORDER — PROMETHAZINE HYDROCHLORIDE 25 MG/1
25 TABLET ORAL EVERY 6 HOURS PRN
Qty: 10 TABLET | Refills: 0 | Status: SHIPPED | OUTPATIENT
Start: 2017-02-27 | End: 2017-03-26

## 2017-02-27 RX ORDER — ONDANSETRON 2 MG/ML
4 INJECTION INTRAMUSCULAR; INTRAVENOUS
Status: COMPLETED | OUTPATIENT
Start: 2017-02-27 | End: 2017-02-27

## 2017-02-27 RX ADMIN — ONDANSETRON 4 MG: 2 INJECTION INTRAMUSCULAR; INTRAVENOUS at 03:02

## 2017-02-27 RX ADMIN — HEPARIN 500 UNITS: 100 SYRINGE at 05:02

## 2017-02-27 RX ADMIN — DIPHENHYDRAMINE HYDROCHLORIDE 25 MG: 50 INJECTION, SOLUTION INTRAMUSCULAR; INTRAVENOUS at 03:02

## 2017-02-27 RX ADMIN — TRAMADOL HYDROCHLORIDE 50 MG: 50 TABLET, FILM COATED ORAL at 03:02

## 2017-02-27 NOTE — ED NOTES
When going into reassess pain, pt asked what the MD planned on doing; Pt informed that MD was talking to hospitalists about possible admission. Pt mentioned that she didn't feel that she needed to stay but said she would wait to hear from MD

## 2017-02-27 NOTE — ED TRIAGE NOTES
73 yo female comes in c/o left sided chest pain for 45 minutes PTA. Pt states that it feels like a spasm that comes and goes. Pt is continuously asking for water MD notified. Pt was informed that we need to get results back before she is able to eat or drink anything.

## 2017-02-27 NOTE — DISCHARGE INSTRUCTIONS
Follow-up with your primary care doctor and cardiologist this week.  Call tomorrow to make an appointment to be seen as soon as possible.  Take pain medication as prescribed.  Return to the emergency department for worsening symptoms, persistent chest pain, difficulty breathing, fever, or any other concerns.

## 2017-02-27 NOTE — ED AVS SNAPSHOT
[facility]  [address] [place] [address]               Olga Smith   2017  1:10 AM   ED    Description:  Female : 1945   Department:  [facility]           Your Care was Coordinated By:     Provider Role From To    Mimi Caceres MD Attending Provider 17 0114 --      Reason for Visit     Chest Pain           Diagnoses this Visit        Comments    Acute chest pain    -  Primary     Left-sided chest wall pain           ED Disposition     ED Disposition Condition Comment    Discharge             To Do List           Follow-up Information     Follow up with Valencia Palencia MD In 2 days.    Specialty:  Family Medicine    Contact information:    [address]  [place] [id number] [phone]          Follow up with Bandar Valladares MD In 2 days.    Specialty:  Cardiology    Why:  If symptoms worsen    Contact information:    [address]  [address]  [place] [address]  [phone]         These Medications        Disp Refills Start End    tramadol (ULTRAM) 50 mg tablet 12 tablet 0 2017 3/9/2017    Take 1 tablet (50 mg total) by mouth every 6 (six) hours as needed. - Oral    Pharmacy: [facility] [address]  [place], [place] - [address] Ph #: [phone]       promethazine (PHENERGAN) 25 MG tablet 10 tablet 0 2017 3/29/2017    Take 1 tablet (25 mg total) by mouth every 6 (six) hours as needed for Nausea. - Oral    Pharmacy: [facility]  [place], [place] - [address] Ph #: [phone]         [facility]     [facility] -  Assistance  Registered nurses in the [facility] provide clinical advisement, health education, appointment booking, and other advisory services.  Call for this free service at [phone].             Medications           Message regarding Medications     Verify the changes and/or additions  to your medication regime listed below are the same as discussed with your clinician today.  If any of these changes or additions are incorrect, please notify your healthcare provider.        START taking these NEW medications        Refills    tramadol (ULTRAM) 50 mg tablet 0    Sig: Take 1 tablet (50 mg total) by mouth every 6 (six) hours as needed.    Class: Print    Route: Oral    promethazine (PHENERGAN) 25 MG tablet 0    Sig: Take 1 tablet (25 mg total) by mouth every 6 (six) hours as needed for Nausea.    Class: Print    Route: Oral      These medications were administered today        Dose Freq    nitroGLYCERIN SL tablet 0.4 mg 0.4 mg Every 5 min PRN    Sig: Place 1 tablet (0.4 mg total) under the tongue every 5 (five) minutes as needed for Chest pain.    Class: Normal    Route: Sublingual    tramadol tablet 50 mg 50 mg ED 1 Time    Sig: Take 1 tablet (50 mg total) by mouth ED 1 Time.    Class: Normal    Route: Oral    ondansetron injection 4 mg 4 mg ED 1 Time    Sig: Inject 4 mg into the vein ED 1 Time.    Class: Normal    Route: Intravenous    diphenhydrAMINE injection 25 mg 25 mg ED 1 Time    Sig: Inject 0.5 mLs (25 mg total) into the vein ED 1 Time.    Class: Normal    Route: Intravenous    heparin, porcine (PF) 100 unit/mL injection flush 500 Units 500 Units ED 1 Time    Sig: Inject 5 mLs (500 Units total) into the vein ED 1 Time.    Class: Normal    Route: Intravenous    Non-formulary Exception Code: Specific indication for non-formulary alternative           Verify that the below list of medications is an accurate representation of the medications you are currently taking.  If none reported, the list may be blank. If incorrect, please contact your healthcare provider. Carry this list with you in case of emergency.           Current Medications     albuterol (PROAIR HFA) 90 mcg/actuation inhaler Inhale 2 puffs into the lungs every 6 (six) hours as needed for Wheezing.    alendronate (FOSAMAX) 70 MG  tablet Take 1 tablet (70 mg total) by mouth every 7 days.    aspirin (ECOTRIN) 81 MG EC tablet Take 1 tablet (81 mg total) by mouth once daily.    atorvastatin (LIPITOR) 80 MG tablet Take 1 tablet (80 mg total) by mouth once daily.    baclofen (LIORESAL) 10 MG tablet Take 1 tablet (10 mg total) by mouth 3 (three) times daily. Take half (5mg)  tablet three times a day    bimatoprost (LUMIGAN) 0.01 % Drop Place 1 drop into both eyes every evening.    calcium carbonate-vitamin D3 (CALTRATE 600 + D) 600 mg (1,500 mg)-800 unit Chew Take 2 tablets by mouth 2 (two) times daily. Pt. Takes= 1.5 tabs every AM & 1 TAB Every evening.    colesevelam (WELCHOL) 625 mg tablet Take 3 tablets (1,875 mg total) by mouth every evening.    cyclobenzaprine (FLEXERIL) 5 MG tablet Take 1 tablet (5 mg total) by mouth 3 (three) times daily as needed for Muscle spasms.    dicyclomine (BENTYL) 10 MG capsule Take 1 capsule (10 mg total) by mouth 4 (four) times daily before meals and nightly.    diphenhydrAMINE (BENADRYL) 25 mg capsule Take 1 each (25 mg total) by mouth nightly as needed for Itching.    fentaNYL (DURAGESIC) 25 mcg/hr Place 1 patch onto the skin every 72 hours.    heparin, porcine (PF) 100 unit/mL injection flush 500 Units Inject 5 mLs (500 Units total) into the vein ED 1 Time.    hydrocortisone (CORTEF) 10 MG Tab Take 1 tablet (10 mg total) by mouth 2 (two) times daily.    isosorbide mononitrate (IMDUR) 30 MG 24 hr tablet Take 1 tablet (30 mg total) by mouth once daily.    levothyroxine (SYNTHROID) 112 MCG tablet Take 1 tablet (112 mcg total) by mouth once daily.    lidocaine (XYLOCAINE) 5 % Oint ointment Apply topically as needed.    metoprolol tartrate (LOPRESSOR) 50 MG tablet Take 1 tablet (50 mg total) by mouth 2 (two) times daily.    montelukast (SINGULAIR) 10 mg tablet Take 10 mg by mouth every evening.    nitroGLYCERIN (NITROSTAT) 0.4 MG SL tablet Place 1 tablet (0.4 mg total) under the tongue every 5 (five) minutes as  needed for Chest pain.    nitroGLYCERIN SL tablet 0.4 mg Place 1 tablet (0.4 mg total) under the tongue every 5 (five) minutes as needed for Chest pain.    pantoprazole (PROTONIX) 40 MG tablet Take 1 tablet (40 mg total) by mouth 2 (two) times daily.    potassium chloride (KLOR-CON) 10 MEQ TbSR Take 1 tablet (10 mEq total) by mouth once daily.    prasugrel (EFFIENT) 10 mg Tab Take 1 tablet (10 mg total) by mouth once daily.    promethazine (PHENERGAN) 25 MG tablet Take 1 tablet (25 mg total) by mouth every 6 (six) hours as needed for Nausea.    promethazine (PHENERGAN) 25 MG tablet Take 1 tablet (25 mg total) by mouth every 6 (six) hours as needed for Nausea.    RANEXA 500 mg Tb12     ranolazine (RANEXA) 1,000 mg Tb12 Take 1 tablet (1,000 mg total) by mouth 2 (two) times daily.    sertraline (ZOLOFT) 50 MG tablet Take 1 tablet (50 mg total) by mouth once daily.    tramadol (ULTRAM) 50 mg tablet Take 1 tablet (50 mg total) by mouth every 6 (six) hours as needed.           Clinical Reference Information           Your Vitals Were     BP                   158/72           Allergies as of 2/27/2017        Reactions    Aspirin Nausea Only    Dilaudid [Hydromorphone (Bulk)] Itching    Rash , swelling of throat.    Iodine And Iodide Containing Products Other (See Comments)    Closing of Throat. No Shellfish of any kind. Can't eat certain kinds of Regular Fish, ie, can't eat: Tuna, Swordfish, [place]=} these are the major fishes she can't eat.    Morphine Itching, Nausea And Vomiting, Hallucinations    Oxycodone Itching, Swelling    Swelling of Throat.    Penicillins Itching, Rash    Percocet [Oxycodone-acetaminophen] Itching, Swelling    Cannot tolerate this medication. Swelling of Throat.    Shellfish Containing Products Other (See Comments)    Closing of Throat. No Shellfish of any kind. Can't eat certain kinds of Regular Fish, ie, can't eat: Tuna, Swordfish, [place]=} these are the major fishes she can't eat.    Valium  [Diazepam] Hives, Itching    Vicodin [Hydrocodone-acetaminophen] Itching, Nausea Only, Rash    Codeine Itching, Nausea Only, Rash    Demerol [Meperidine] Itching, Rash      Immunizations Administered on Date of Encounter - 2/27/2017     None      ED Micro, Lab, POCT     Start Ordered       Status Ordering Provider    02/27/17 0128 02/27/17 0127  TSH  Once      Final result     02/27/17 0128 02/27/17 0127  Troponin I  STAT      Final result     02/27/17 0128 02/27/17 0127  Protime-INR  STAT      Final result     02/27/17 0128 02/27/17 0127  APTT  STAT      Final result     02/27/17 0128 02/27/17 0127  B-Type natriuretic peptide (BNP)  STAT      Final result     02/27/17 0128 02/27/17 0127  Magnesium  Once      Final result     02/27/17 0127 02/27/17 0127  CBC auto differential  STAT      Final result     02/27/17 0127 02/27/17 0127  Comprehensive metabolic panel  STAT      Final result     02/27/17 0127 02/27/17 0127  Urinalysis - Clean Catch  STAT      Final result     02/27/17 0127 02/27/17 0127  T4, free  Once      Final result     02/27/17 0127 02/27/17 0127  Urinalysis Microscopic  Once      Final result       ED Imaging Orders     Start Ordered       Status Ordering Provider    02/27/17 0127 02/27/17 0127  X-Ray Chest AP Portable  1 time imaging      Final result         Discharge Instructions       Follow-up with your primary care doctor and cardiologist this week.  Call tomorrow to make an appointment to be seen as soon as possible.  Take pain medication as prescribed.  Return to the emergency department for worsening symptoms, persistent chest pain, difficulty breathing, fever, or any other concerns.    Discharge References/Attachments     CHEST WALL PAIN, COSTOCHONDRITIS (ENGLISH)    CHEST PAIN, NONCARDIAC  (ENGLISH)      Your Scheduled Appointments     Mar 01, 2017 11:00 AM CST   Infusion 15 Min with CHAIR 07 [facility] ([facility]    [address]  [address]   [phone]            Apr 05, 2017 11:00 AM CDT   Infusion 15 Min with CHAIR 01 [facility] ([facility] [place])    [address] [address]   [phone]            Apr 13, 2017  3:00 PM CDT   GASTROENTEROLOGY ESTABLISHED PATIENT with MD Gonzalo Wasserman - Gastroenterology ([address] )    [address]  [facility] [id number] [phone]            May 16, 2017 10:15 AM CDT   [facility] with PERIMETRY, HUMPH   Gonzalo Rae - Ophthalmology ([address] )    [address]  [facility] [address] [phone]            May 16, 2017 10:45 AM CDT   Established Patient Visit with MD Gonzalo Spicer - Ophthalmology ([facility] )    [address]  [facility] [address] [phone]              Smoking Cessation     If you would like to quit smoking:   You may be eligible for free services if you are a [place] resident and started smoking cigarettes before September 1, 1988.  Call the Smoking Cessation Trust ([facility]) toll free at (851) 155-7811 or (949) 117-7224.   Call 1-800-QUIT-NOW if you do not meet the above criteria.             [facility] complies with applicable Federal civil rights laws and does not discriminate on the basis of race, color, national origin, age, disability, or sex.        Language Assistance Services     ATTENTION: Language assistance services are available, free of charge. Please call [phone].      ATENCIÓN: Si habla español, tiene a avilez disposición servicios gratuitos de asistencia lingüística. Llame al [phone].     CHÚ Ý: N?u b?n nói Ti?ng Vi?t, có các d?ch v? h? tr? ngôn ng? mi?n phí dành cho b?n. G?i s? [phone].

## 2017-02-27 NOTE — ED PROVIDER NOTES
Encounter Date: 2/27/2017    SCRIBE #1 NOTE: I, Lynnette Mendoza, am scribing for, and in the presence of,  Mimi Caceres MD. I have scribed the following portions of the note - Other sections scribed: HPI, ROS.       History     Chief Complaint   Patient presents with    Chest Pain     L sided chest pain and SOB X 30 min. Has pacemaker.      Review of patient's allergies indicates:   Allergen Reactions    Aspirin Nausea Only    Dilaudid [hydromorphone (bulk)] Itching     Rash , swelling of throat.    Iodine and iodide containing products Other (See Comments)     Closing of Throat. No Shellfish of any kind. Can't eat certain kinds of Regular Fish, ie, can't eat: Tuna, Swordfish, [place]=} these are the major fishes she can't eat.    Morphine Itching, Nausea And Vomiting and Hallucinations    Oxycodone Itching and Swelling     Swelling of Throat.    Penicillins Itching and Rash    Percocet [oxycodone-acetaminophen] Itching and Swelling     Cannot tolerate this medication. Swelling of Throat.    Shellfish containing products Other (See Comments)     Closing of Throat. No Shellfish of any kind. Can't eat certain kinds of Regular Fish, ie, can't eat: Tuna, Swordfish, [place]=} these are the major fishes she can't eat.      Valium [diazepam] Hives and Itching    Vicodin [hydrocodone-acetaminophen] Itching, Nausea Only and Rash    Codeine Itching, Nausea Only and Rash    Demerol [meperidine] Itching and Rash     HPI Comments: CC: Chest Pain    HPI: 72 year old female with CAD with pacemaker, HTN, COPD, asthma, and hx of 3 stent placements presents to the ED c/o L-sided chest pain with associated SOB that began 45 minutes PTA. Symptoms are acute onset and severe (9/10). Pain is exacerbated with palpation. Pt denies fever, diaphoresis, cough, N/V/D, and any other associated symptoms. No alleviating/modifying factors.    The history is provided by the patient. No  was used.     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    Encounter for blood transfusion     Extravasation injury of IV catheter site with other complication 2008    Pain remains to left forearm, no IV sticks    Fibromyalgia     Glaucoma     Hypertension     Irritable bowel syndrome (IBS)     Osteoporosis 8/7/2015    Pacemaker     Thyroid disease      Past Surgical History:   Procedure Laterality Date    ABDOMINAL SURGERY      CARDIAC SURGERY      stents and pacemaker    COLONOSCOPY N/A 9/19/2016    Procedure: COLONOSCOPY;  Surgeon: Lisandro Kaba MD;  Location: [facility];  Service: Endoscopy;  Laterality: N/A;  [facility], HAS TO COME HERE    HERNIA REPAIR      HYSTERECTOMY      SMALL INTESTINE SURGERY      TRACHEOSTOMY TUBE PLACEMENT      pt kept x 8.5 years. Removed during 2009     Family History   Problem Relation Age of Onset    Cataracts Brother     Glaucoma Brother     Glaucoma Mother     Lung cancer Mother     Coronary artery disease Mother     Coronary artery disease Father     Cataracts Sister     Glaucoma Sister     No Known Problems Maternal Aunt     No Known Problems Maternal Uncle     No Known Problems Paternal Aunt     No Known Problems Paternal Uncle     No Known Problems Maternal Grandmother     No Known Problems Maternal Grandfather     No Known Problems Paternal Grandmother     No Known Problems Paternal Grandfather     Colon cancer Neg Hx     Colon polyps Neg Hx     Amblyopia Neg Hx     Blindness Neg Hx     Cancer Neg Hx     Diabetes Neg Hx     Hypertension Neg Hx     Macular degeneration Neg Hx     Retinal detachment Neg Hx     Strabismus Neg Hx     Stroke Neg Hx     Thyroid disease Neg Hx      Social History   Substance Use Topics    Smoking status: Former Smoker     Packs/day: 1.00      Years: 25.00     Types: Cigarettes     Quit date: 11/9/1985    Smokeless tobacco: Never Used    Alcohol use No     Review of Systems   Constitutional: Negative for chills, diaphoresis and fever.   HENT: Negative for ear pain and sore throat.    Eyes: Negative for photophobia and visual disturbance.   Respiratory: Positive for shortness of breath. Negative for cough.    Cardiovascular: Positive for chest pain (L-sided).   Gastrointestinal: Negative for abdominal pain, diarrhea, nausea and vomiting.   Genitourinary: Negative for dysuria.   Musculoskeletal: Negative for back pain.   Skin: Negative for rash.   Neurological: Negative for headaches.       Physical Exam   Initial Vitals   BP Pulse Resp Temp SpO2   02/27/17 0108 02/27/17 0108 02/27/17 0108 02/27/17 0108 02/27/17 0108   162/77 67 20 97.6 °F (36.4 °C) 98 %     Physical Exam    Nursing note and vitals reviewed.  Constitutional: She appears well-developed and well-nourished.   Elderly female, appears uncomfortable.    HENT:   Head: Normocephalic and atraumatic.   Eyes: Conjunctivae and EOM are normal.   Neck: Neck supple.   Cardiovascular: Regular rhythm and normal heart sounds. Exam reveals no gallop and no friction rub.    No murmur heard.  Pulmonary/Chest: Breath sounds normal. No respiratory distress. She has no wheezes. She has no rhonchi. She has no rales. She exhibits tenderness (left anterior chest wall TTP).   Abdominal: Soft. Bowel sounds are normal. She exhibits no distension. There is no tenderness. There is no rebound and no guarding.   Musculoskeletal: Normal range of motion. She exhibits no edema.   Neurological: She is alert and oriented to person, place, and time. No cranial nerve deficit.   Skin: No rash noted.         ED Course   Procedures  Labs Reviewed   CBC W/ AUTO DIFFERENTIAL - Abnormal; Notable for the following:        Result Value    WBC 13.16 (*)     RBC 3.49 (*)     Hemoglobin 9.8 (*)     Hematocrit 31.4 (*)     MCHC 31.2 (*)      RDW 15.0 (*)     Gran # 8.1 (*)     Mono # 1.1 (*)     All other components within normal limits   COMPREHENSIVE METABOLIC PANEL - Abnormal; Notable for the following:     Anion Gap 7 (*)     All other components within normal limits   URINALYSIS - Abnormal; Notable for the following:     Leukocytes, UA Trace (*)     All other components within normal limits   TSH - Abnormal; Notable for the following:     TSH <0.010 (*)     All other components within normal limits   APTT - Abnormal; Notable for the following:     aPTT 35.8 (*)     All other components within normal limits   B-TYPE NATRIURETIC PEPTIDE - Abnormal; Notable for the following:      (*)     All other components within normal limits   T4, FREE - Abnormal; Notable for the following:     Free T4 1.81 (*)     All other components within normal limits   TROPONIN I   PROTIME-INR   MAGNESIUM   URINALYSIS MICROSCOPIC     EKG Readings: (Independently Interpreted)   Normal sinus rhythm, rate approximately 64.  No ST elevation or depression.  QTc 427.          Medical Decision Making:   History:   Old Medical Records: I decided to obtain old medical records.  Old Records Summarized: records from clinic visits.       <> Summary of Records: Seen by cardiology earlier this month.  Had patent stents on recent angiogram  72 y.o. female with multiple medical problems including history of hypertension, coronary artery disease, pacemaker, fibromyalgia, presents to the emergency department with left-sided chest pain and shortness of breath for 30 minutes.  On exam patient has left-sided chest wall tenderness to palpation.  Differential diagnosis includes but not limited to musculoskeletal pain, acute MI, PE, CHF, pneumonia, sepsis, among others. EKG independently interpreted by me, is negative for acute ischemia. Chest x-ray independently interpreted by me as negative for acute infiltrates, pneumothorax, effusion, widening mediastinum, or other acute  "cardiopulmonary process.  Labs ordered and reveal troponin less than 0.006.  .  Creatinine within normal limits.  White blood cell count mildly elevated at 13.  Hemoglobin 9.8, hematocrit 31.4.  Urinalysis is not consistent with infection.  TSH is low, free T4 1.81. This is similar to previous labs, however I have advised her to follow up with PCP for adjustment of her thyroid medications.  Per chart review, patient had recent angiogram in November, 2016, that showed patent stents, no new coronary artery disease.  She was recently seen and evaluated by cardiology this month, who states that her angiogram was good and to follow-up with her PCP and other specialists.  Patient has reproducible left-sided chest wall tenderness to palpation.  Suspect musculoskeletal cause.  I discussed this case with hospitalist, Dr. Helton.  We reviewed the patient's cardiac history, and recent angiogram results.  He believes that if the patient has recent negative angiogram, that is unlikely be cardiac cause of her pain.  She reports improvement of pain after treatment provided in ED. She declined NTG, stating "it doesn't work", and specifically requested Tramadol, zofran and benadryl for her pain. She states she is out of tramadol at home. I discussed with the patient admission to the hospital for serial cardiac enzymes, versus going home to follow up with her primary care doctor as an outpatient.  She states that she would prefer to go home.  I will prescribe limited quantity tramadol. Vitals stable. No hypoxia, tachypnea  or tachycardia to suggest PE. CHF unlikely. I believe she is stable for discharge at this time. Advised close follow up with PCP and cardiology. Patient given strict return warnings.  She states understanding this charge plan and is comfortable going home.            Scribe Attestation:   Scribe #1: I performed the above scribed service and the documentation accurately describes the services I performed. I " attest to the accuracy of the note.    Attending Attestation:           Physician Attestation for Scribe:  Physician Attestation Statement for Scribe #1: I, Mimi Caceres MD, reviewed documentation, as scribed by Lynnette Mendoza in my presence, and it is both accurate and complete.                 ED Course     Clinical Impression:   The primary encounter diagnosis was Acute chest pain. A diagnosis of Left-sided chest wall pain was also pertinent to this visit.          Mimi Caceres MD  03/03/17 0865

## 2017-03-01 ENCOUNTER — INFUSION (OUTPATIENT)
Dept: INFUSION THERAPY | Facility: HOSPITAL | Age: 72
End: 2017-03-01
Attending: FAMILY MEDICINE
Payer: MEDICARE

## 2017-03-01 DIAGNOSIS — E03.9 HYPOTHYROID: Primary | ICD-10-CM

## 2017-03-01 LAB
ANION GAP SERPL CALC-SCNC: 10 MMOL/L
BUN SERPL-MCNC: 12 MG/DL
CALCIUM SERPL-MCNC: 9.5 MG/DL
CHLORIDE SERPL-SCNC: 109 MMOL/L
CO2 SERPL-SCNC: 25 MMOL/L
CREAT SERPL-MCNC: 1 MG/DL
EST. GFR  (AFRICAN AMERICAN): >60 ML/MIN/1.73 M^2
EST. GFR  (NON AFRICAN AMERICAN): 57 ML/MIN/1.73 M^2
GLUCOSE SERPL-MCNC: 140 MG/DL
POTASSIUM SERPL-SCNC: 2.9 MMOL/L
SODIUM SERPL-SCNC: 144 MMOL/L

## 2017-03-01 PROCEDURE — 80048 BASIC METABOLIC PNL TOTAL CA: CPT

## 2017-03-01 PROCEDURE — 36591 DRAW BLOOD OFF VENOUS DEVICE: CPT

## 2017-03-01 PROCEDURE — 25000003 PHARM REV CODE 250: Performed by: FAMILY MEDICINE

## 2017-03-01 RX ORDER — SODIUM CHLORIDE 0.9 % (FLUSH) 0.9 %
10 SYRINGE (ML) INJECTION
Status: DISCONTINUED | OUTPATIENT
Start: 2017-03-01 | End: 2017-03-01 | Stop reason: HOSPADM

## 2017-03-01 RX ORDER — HEPARIN 100 UNIT/ML
500 SYRINGE INTRAVENOUS
Status: COMPLETED | OUTPATIENT
Start: 2017-03-01 | End: 2017-03-01

## 2017-03-01 RX ADMIN — SODIUM CHLORIDE, PRESERVATIVE FREE 10 ML: 5 INJECTION INTRAVENOUS at 11:03

## 2017-03-01 RX ADMIN — Medication 500 UNITS: at 11:03

## 2017-03-01 NOTE — PROGRESS NOTES
Please notify patient that she needs to take potassium now.  She should take two tablets for the next 3 days and then resume one tablet daily

## 2017-03-03 ENCOUNTER — TELEPHONE (OUTPATIENT)
Dept: FAMILY MEDICINE | Facility: CLINIC | Age: 72
End: 2017-03-03

## 2017-03-03 NOTE — TELEPHONE ENCOUNTER
----- Message from Valencia Palencia MD sent at 3/1/2017  4:27 PM CST -----  Please notify patient that she needs to take potassium now.  She should take two tablets for the next 3 days and then resume one tablet daily

## 2017-03-03 NOTE — TELEPHONE ENCOUNTER
Patient in clinic to confirm the directions given to her in message below and requested they be written down.  Instructions written down for patient per request.

## 2017-03-08 ENCOUNTER — INFUSION (OUTPATIENT)
Dept: INFUSION THERAPY | Facility: HOSPITAL | Age: 72
End: 2017-03-08
Attending: FAMILY MEDICINE
Payer: MEDICARE

## 2017-03-08 DIAGNOSIS — E78.5 DYSLIPIDEMIA: ICD-10-CM

## 2017-03-08 DIAGNOSIS — K58.0 IRRITABLE BOWEL SYNDROME WITH DIARRHEA: ICD-10-CM

## 2017-03-08 DIAGNOSIS — E87.5 HYPERKALEMIA: Primary | ICD-10-CM

## 2017-03-08 DIAGNOSIS — K91.5 POST-CHOLECYSTECTOMY SYNDROME: ICD-10-CM

## 2017-03-08 DIAGNOSIS — E78.00 PURE HYPERCHOLESTEROLEMIA: ICD-10-CM

## 2017-03-08 LAB
ALBUMIN SERPL BCP-MCNC: 3.7 G/DL
ALP SERPL-CCNC: 73 U/L
ALT SERPL W/O P-5'-P-CCNC: 12 U/L
ANION GAP SERPL CALC-SCNC: 6 MMOL/L
AST SERPL-CCNC: 13 U/L
BILIRUB SERPL-MCNC: 0.4 MG/DL
BUN SERPL-MCNC: 12 MG/DL
CALCIUM SERPL-MCNC: 10.1 MG/DL
CHLORIDE SERPL-SCNC: 107 MMOL/L
CHOLEST/HDLC SERPL: 3.1 {RATIO}
CO2 SERPL-SCNC: 30 MMOL/L
CREAT SERPL-MCNC: 0.8 MG/DL
EST. GFR  (AFRICAN AMERICAN): >60 ML/MIN/1.73 M^2
EST. GFR  (NON AFRICAN AMERICAN): >60 ML/MIN/1.73 M^2
GLUCOSE SERPL-MCNC: 81 MG/DL
HDL/CHOLESTEROL RATIO: 32.1 %
HDLC SERPL-MCNC: 156 MG/DL
HDLC SERPL-MCNC: 50 MG/DL
LDLC SERPL CALC-MCNC: 93.4 MG/DL
NONHDLC SERPL-MCNC: 106 MG/DL
POTASSIUM SERPL-SCNC: 4.2 MMOL/L
PROT SERPL-MCNC: 6.8 G/DL
SODIUM SERPL-SCNC: 143 MMOL/L
TRIGL SERPL-MCNC: 63 MG/DL

## 2017-03-08 PROCEDURE — 36591 DRAW BLOOD OFF VENOUS DEVICE: CPT

## 2017-03-08 PROCEDURE — 25000003 PHARM REV CODE 250: Performed by: INTERNAL MEDICINE

## 2017-03-08 PROCEDURE — 80061 LIPID PANEL: CPT

## 2017-03-08 PROCEDURE — 80053 COMPREHEN METABOLIC PANEL: CPT

## 2017-03-08 PROCEDURE — 36415 COLL VENOUS BLD VENIPUNCTURE: CPT

## 2017-03-08 RX ORDER — COLESEVELAM 180 1/1
1875 TABLET ORAL NIGHTLY
Qty: 270 TABLET | Refills: 3 | Status: SHIPPED | OUTPATIENT
Start: 2017-03-08 | End: 2018-03-08

## 2017-03-08 RX ORDER — DICYCLOMINE HYDROCHLORIDE 10 MG/1
10 CAPSULE ORAL
Qty: 120 CAPSULE | Refills: 3 | Status: SHIPPED | OUTPATIENT
Start: 2017-03-08

## 2017-03-08 RX ORDER — ATORVASTATIN CALCIUM 80 MG/1
80 TABLET, FILM COATED ORAL DAILY
Qty: 90 TABLET | Refills: 0 | Status: SHIPPED | OUTPATIENT
Start: 2017-03-08 | End: 2018-03-08

## 2017-03-08 RX ORDER — MONTELUKAST SODIUM 10 MG/1
10 TABLET ORAL NIGHTLY
Qty: 90 TABLET | Refills: 1 | Status: SHIPPED | OUTPATIENT
Start: 2017-03-08

## 2017-03-08 RX ORDER — SODIUM CHLORIDE 0.9 % (FLUSH) 0.9 %
10 SYRINGE (ML) INJECTION
Status: DISCONTINUED | OUTPATIENT
Start: 2017-03-08 | End: 2017-03-08 | Stop reason: HOSPADM

## 2017-03-08 RX ORDER — HEPARIN 100 UNIT/ML
500 SYRINGE INTRAVENOUS
Status: COMPLETED | OUTPATIENT
Start: 2017-03-08 | End: 2017-03-08

## 2017-03-08 RX ADMIN — SODIUM CHLORIDE, PRESERVATIVE FREE 10 ML: 5 INJECTION INTRAVENOUS at 11:03

## 2017-03-08 RX ADMIN — HEPARIN 500 UNITS: 100 SYRINGE at 11:03

## 2017-03-13 RX ORDER — RANOLAZINE 1000 MG/1
1000 TABLET, EXTENDED RELEASE ORAL 2 TIMES DAILY
Qty: 60 TABLET | Refills: 11 | Status: SHIPPED | OUTPATIENT
Start: 2017-03-13 | End: 2018-03-13

## 2017-03-26 ENCOUNTER — HOSPITAL ENCOUNTER (EMERGENCY)
Facility: HOSPITAL | Age: 72
Discharge: HOME | End: 2017-03-26
Attending: EMERGENCY MEDICINE
Payer: MEDICARE

## 2017-03-26 VITALS
HEIGHT: 60 IN | HEART RATE: 84 BPM | SYSTOLIC BLOOD PRESSURE: 166 MMHG | TEMPERATURE: 98 F | RESPIRATION RATE: 18 BRPM | WEIGHT: 178 LBS | OXYGEN SATURATION: 96 % | DIASTOLIC BLOOD PRESSURE: 88 MMHG | BODY MASS INDEX: 34.95 KG/M2

## 2017-03-26 DIAGNOSIS — R07.9 CHEST PAIN: ICD-10-CM

## 2017-03-26 DIAGNOSIS — R11.0 NAUSEA: ICD-10-CM

## 2017-03-26 DIAGNOSIS — R10.9 ABDOMINAL PAIN, UNSPECIFIED LOCATION: Primary | ICD-10-CM

## 2017-03-26 LAB
ALBUMIN SERPL BCP-MCNC: 3.7 G/DL
ALP SERPL-CCNC: 78 U/L
ALT SERPL W/O P-5'-P-CCNC: 9 U/L
ANION GAP SERPL CALC-SCNC: 11 MMOL/L
AST SERPL-CCNC: 14 U/L
BASOPHILS # BLD AUTO: 0.03 K/UL
BASOPHILS NFR BLD: 0.3 %
BILIRUB SERPL-MCNC: 0.5 MG/DL
BILIRUB UR QL STRIP: NEGATIVE
BNP SERPL-MCNC: 83 PG/ML
BUN SERPL-MCNC: 6 MG/DL
CALCIUM SERPL-MCNC: 10.7 MG/DL
CHLORIDE SERPL-SCNC: 101 MMOL/L
CLARITY UR: CLEAR
CO2 SERPL-SCNC: 28 MMOL/L
COLOR UR: YELLOW
CREAT SERPL-MCNC: 0.9 MG/DL
DIFFERENTIAL METHOD: ABNORMAL
EOSINOPHIL # BLD AUTO: 0.4 K/UL
EOSINOPHIL NFR BLD: 4.1 %
ERYTHROCYTE [DISTWIDTH] IN BLOOD BY AUTOMATED COUNT: 15.4 %
EST. GFR  (AFRICAN AMERICAN): >60 ML/MIN/1.73 M^2
EST. GFR  (NON AFRICAN AMERICAN): >60 ML/MIN/1.73 M^2
GLUCOSE SERPL-MCNC: 83 MG/DL
GLUCOSE UR QL STRIP: NEGATIVE
HCT VFR BLD AUTO: 33.9 %
HGB BLD-MCNC: 10.7 G/DL
HGB UR QL STRIP: NEGATIVE
KETONES UR QL STRIP: ABNORMAL
LEUKOCYTE ESTERASE UR QL STRIP: NEGATIVE
LYMPHOCYTES # BLD AUTO: 2 K/UL
LYMPHOCYTES NFR BLD: 23.3 %
MCH RBC QN AUTO: 27.9 PG
MCHC RBC AUTO-ENTMCNC: 31.6 %
MCV RBC AUTO: 88 FL
MONOCYTES # BLD AUTO: 0.9 K/UL
MONOCYTES NFR BLD: 9.9 %
NEUTROPHILS # BLD AUTO: 5.4 K/UL
NEUTROPHILS NFR BLD: 62.2 %
NITRITE UR QL STRIP: NEGATIVE
PH UR STRIP: 6 [PH] (ref 5–8)
PLATELET # BLD AUTO: 355 K/UL
PMV BLD AUTO: 9.6 FL
POTASSIUM SERPL-SCNC: 3.8 MMOL/L
PROT SERPL-MCNC: 7 G/DL
PROT UR QL STRIP: NEGATIVE
RBC # BLD AUTO: 3.84 M/UL
SODIUM SERPL-SCNC: 140 MMOL/L
SP GR UR STRIP: 1.01 (ref 1–1.03)
TROPONIN I SERPL DL<=0.01 NG/ML-MCNC: 0.02 NG/ML
URN SPEC COLLECT METH UR: ABNORMAL
UROBILINOGEN UR STRIP-ACNC: NEGATIVE EU/DL
WBC # BLD AUTO: 8.61 K/UL

## 2017-03-26 PROCEDURE — 84484 ASSAY OF TROPONIN QUANT: CPT

## 2017-03-26 PROCEDURE — 85025 COMPLETE CBC W/AUTO DIFF WBC: CPT

## 2017-03-26 PROCEDURE — 83880 ASSAY OF NATRIURETIC PEPTIDE: CPT

## 2017-03-26 PROCEDURE — 80053 COMPREHEN METABOLIC PANEL: CPT

## 2017-03-26 PROCEDURE — 99285 EMERGENCY DEPT VISIT HI MDM: CPT | Mod: 25

## 2017-03-26 PROCEDURE — 25000003 PHARM REV CODE 250: Performed by: EMERGENCY MEDICINE

## 2017-03-26 PROCEDURE — 96375 TX/PRO/DX INJ NEW DRUG ADDON: CPT

## 2017-03-26 PROCEDURE — 81003 URINALYSIS AUTO W/O SCOPE: CPT

## 2017-03-26 PROCEDURE — 96374 THER/PROPH/DIAG INJ IV PUSH: CPT

## 2017-03-26 PROCEDURE — 63600175 PHARM REV CODE 636 W HCPCS: Performed by: EMERGENCY MEDICINE

## 2017-03-26 RX ORDER — ONDANSETRON 2 MG/ML
4 INJECTION INTRAMUSCULAR; INTRAVENOUS
Status: COMPLETED | OUTPATIENT
Start: 2017-03-26 | End: 2017-03-26

## 2017-03-26 RX ORDER — HEPARIN 100 UNIT/ML
500 SYRINGE INTRAVENOUS
Status: COMPLETED | OUTPATIENT
Start: 2017-03-26 | End: 2017-03-26

## 2017-03-26 RX ORDER — PROMETHAZINE HYDROCHLORIDE 25 MG/1
25 TABLET ORAL EVERY 6 HOURS PRN
Qty: 15 TABLET | Refills: 0 | Status: SHIPPED | OUTPATIENT
Start: 2017-03-26

## 2017-03-26 RX ADMIN — ONDANSETRON 4 MG: 2 INJECTION INTRAMUSCULAR; INTRAVENOUS at 01:03

## 2017-03-26 RX ADMIN — Medication 500 UNITS: at 03:03

## 2017-03-26 NOTE — ED PROVIDER NOTES
Encounter Date: 3/26/2017    SCRIBE #1 NOTE: I, Nitza Berg, am scribing for, and in the presence of,  Matt Burks MD. I have scribed the following portions of the note - Other sections scribed: ROS, HPI.       History     Chief Complaint   Patient presents with    Dizziness     Dizziness X 4 days.    Chest Pain     R sided chest pain that started this a.m. Has mild SOB.     Abdominal Pain     Diffuse abd pain since last PM. Denies N/V/D     Review of patient's allergies indicates:   Allergen Reactions    Aspirin Nausea Only    Dilaudid [hydromorphone (bulk)] Itching     Rash , swelling of throat.    Iodine and iodide containing products Other (See Comments)     Closing of Throat. No Shellfish of any kind. Can't eat certain kinds of Regular Fish, ie, can't eat: Tuna, Swordfish, [place]=} these are the major fishes she can't eat.    Morphine Itching, Nausea And Vomiting and Hallucinations    Oxycodone Itching and Swelling     Swelling of Throat.    Penicillins Itching and Rash    Percocet [oxycodone-acetaminophen] Itching and Swelling     Cannot tolerate this medication. Swelling of Throat.    Shellfish containing products Other (See Comments)     Closing of Throat. No Shellfish of any kind. Can't eat certain kinds of Regular Fish, ie, can't eat: Tuna, Swordfish, [place]=} these are the major fishes she can't eat.      Valium [diazepam] Hives and Itching    Vicodin [hydrocodone-acetaminophen] Itching, Nausea Only and Rash    Codeine Itching, Nausea Only and Rash    Demerol [meperidine] Itching and Rash     HPI Comments: CC: Chest Pain    HPI: Patient is a 72 y.o. 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 Pain is severe and constant. She denies fever, BLE swelling, BLE pain, headache, numbness, and/or weakness. She states she has undergone an appendectomy and cholecystectomy in the past.      The history is provided by the patient. No  was used.     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    Encounter for blood transfusion     Extravasation injury of IV catheter site with other complication 2008    Pain remains to left forearm, no IV sticks    Fibromyalgia     Glaucoma     Hypertension     Irritable bowel syndrome (IBS)     Osteoporosis 8/7/2015    Pacemaker     Thyroid disease      Past Surgical History:   Procedure Laterality Date    ABDOMINAL SURGERY      CARDIAC SURGERY      stents and pacemaker    COLONOSCOPY N/A 9/19/2016    Procedure: COLONOSCOPY;  Surgeon: Lisandro Kaba MD;  Location: [facility];  Service: Endoscopy;  Laterality: N/A;  OUT PATIENT [facility]/CAN'T DO PROCEDURE AT [facility], HAS TO COME HERE    HERNIA REPAIR      HYSTERECTOMY      SMALL INTESTINE SURGERY      TRACHEOSTOMY TUBE PLACEMENT      pt kept x 8.5 years. Removed during 2009     Family History   Problem Relation Age of Onset    Cataracts Brother     Glaucoma Brother     Glaucoma Mother     Lung cancer Mother     Coronary artery disease Mother     Coronary artery disease Father     Cataracts Sister     Glaucoma Sister     No Known Problems Maternal Aunt     No Known Problems Maternal Uncle     No Known Problems Paternal Aunt     No Known Problems Paternal Uncle     No Known Problems Maternal Grandmother     No Known Problems Maternal Grandfather     No Known Problems  Paternal Grandmother     No Known Problems Paternal Grandfather     Colon cancer Neg Hx     Colon polyps Neg Hx     Amblyopia Neg Hx     Blindness Neg Hx     Cancer Neg Hx     Diabetes Neg Hx     Hypertension Neg Hx     Macular degeneration Neg Hx     Retinal detachment Neg Hx     Strabismus Neg Hx     Stroke Neg Hx     Thyroid disease Neg Hx      Social History   Substance Use Topics    Smoking status: Former Smoker     Packs/day: 1.00     Years: 25.00     Types: Cigarettes     Quit date: 11/9/1985    Smokeless tobacco: Never Used    Alcohol use No     Review of Systems   Constitutional: Negative for chills and fever.   HENT: Negative for sore throat.    Eyes: Negative for redness.   Respiratory: Positive for cough and shortness of breath.    Cardiovascular: Positive for chest pain (R sided).   Gastrointestinal: Positive for abdominal pain (RLQ), diarrhea, nausea and vomiting.   Genitourinary: Negative for dysuria.   Musculoskeletal: Negative for back pain.   Skin: Negative for rash.   Neurological: Positive for light-headedness. Negative for weakness, numbness and headaches.       Physical Exam   Initial Vitals   BP Pulse Resp Temp SpO2   03/26/17 1210 03/26/17 1210 03/26/17 1210 03/26/17 1210 03/26/17 1210   136/89 82 18 98.3 °F (36.8 °C) 95 %     Physical Exam    Nursing note and vitals reviewed.  Constitutional: She appears well-developed and well-nourished. She is not diaphoretic. No distress.   The patient appears well and has reassuring vital signs but is minimally participatory in her exam.   HENT:   Head: Normocephalic and atraumatic.   Right Ear: External ear normal.   Left Ear: External ear normal.   Nose: Nose normal.   Mouth/Throat: Oropharynx is clear and moist.   Eyes: Conjunctivae and EOM are normal. Pupils are equal, round, and reactive to light. Right eye exhibits no discharge. Left eye exhibits no discharge. No scleral icterus.   Neck: Normal range of motion. Neck supple.    Cardiovascular: Normal rate, regular rhythm, normal heart sounds and intact distal pulses.   No murmur heard.  Pulmonary/Chest: Breath sounds normal. No respiratory distress. She has no wheezes. She has no rhonchi. She has no rales.   Abdominal: Soft. Bowel sounds are normal. She exhibits no distension and no mass. There is tenderness. There is no rebound and no guarding.   Abdomen is soft with normal bowel sounds.  There is a vertical surgical scar noted below the umbilicus.  There is right lower quadrant and left or quadrant mild tenderness with palpation.  No hernias or masses.   Musculoskeletal: Normal range of motion. She exhibits no edema or tenderness.   Neurological: She is alert and oriented to person, place, and time. She has normal strength. No cranial nerve deficit or sensory deficit.   Skin: Skin is warm and dry. No rash noted. No erythema. No pallor.   Psychiatric: Her behavior is normal. Judgment normal.         ED Course   Procedures  Labs Reviewed   CBC W/ AUTO DIFFERENTIAL - Abnormal; Notable for the following:        Result Value    RBC 3.84 (*)     Hemoglobin 10.7 (*)     Hematocrit 33.9 (*)     MCHC 31.6 (*)     RDW 15.4 (*)     Platelets 355 (*)     All other components within normal limits   COMPREHENSIVE METABOLIC PANEL - Abnormal; Notable for the following:     BUN, Bld 6 (*)     Calcium 10.7 (*)     ALT 9 (*)     All other components within normal limits   URINALYSIS - Abnormal; Notable for the following:     Ketones, UA Trace (*)     All other components within normal limits   TROPONIN I   B-TYPE NATRIURETIC PEPTIDE     EKG Readings: (Independently Interpreted)   Initial Reading: No STEMI.   EKG reviewed and interpreted by me shows sinus rhythm at rate 82.  TN, QRS, QT intervals within normal limits.  There is a normal axis.  Is good R-wave progression across the precordium.  There are no ST segment or T-wave ischemic findings.       X-Rays:   Independently Interpreted Readings:   Other  Readings:  CT the abdomen and pelvis without contrast reveals diverticulosis without diverticulitis.    Chest x-ray reviewed and interpreted by me shows no evidence of pulmonary edema, pneumothorax, or consolidations/ infiltrates.  Enlarged cardiac silhouette, chronic.    Medical Decision Making:   ED Management:  This is the emergent evaluation of a 72-year-old female who presents the emergency department with multiple complaints including lightheadedness, right-sided chest pain, shortness of breath, right-sided lower abdominal pain.  Patient is felt lightheaded for 4 days.  She has been having chest pain since this morning with shortness of breath.  She has had abdominal pain since last night.Differential diagnosis at the time of initial evaluation included, but was not limited to:  acute myocardial infarction, acute coronary syndrome, pericarditis, myocarditis, pneumonia, pneumothorax, gastroesophageal reflux disease, pleurisy, costochondritis.  Patient has had a prior appendectomy by history.  I considered right-sided diverticulitis and small bowel obstruction.  I considered but doubt pulmonary embolus and aortic dissection.  I reviewed this patient's medical records.  Multiple visits for chest pain.  No significant recent cardiac findings.  She has been seen by Dr. Valladares.    Patient comes to the emergency department frequently for similar complaints.  While she does have risk factors for acute coronary syndrome and other significant pathology, I do not see any indication of this today.  The patient does have a cardiologist with whom she can follow-up.  CT the abdomen was performed of the patient's lower abdominal pain.  There was no evidence of surgical process or infection.  Patient is safe and stable for discharge with symptomatic management of her nausea as well as follow-up with cardiology in her PCP.  She was advised return for any new or worsening symptoms.            Scribe Attestation:   Scribe #1: I  performed the above scribed service and the documentation accurately describes the services I performed. I attest to the accuracy of the note.    Attending Attestation:           Physician Attestation for Scribe:  Physician Attestation Statement for Scribe #1: I, Matt Burks MD, reviewed documentation, as scribed by Nitza Berg in my presence, and it is both accurate and complete.                 ED Course     Clinical Impression:   The primary encounter diagnosis was Abdominal pain, unspecified location. Diagnoses of Chest pain and Nausea were also pertinent to this visit.          Matt Burks Jr., MD  03/26/17 6645

## 2017-03-26 NOTE — ED TRIAGE NOTES
Patient comes to the ER with midsternal chest pain and epigastric pain. Patient states both pains started on Saturday. Patient states the pain was getting gradually worse. Patient aaox4, nad.

## 2017-03-26 NOTE — ED AVS SNAPSHOT
[facility]  [address] [place] [address]               Olga Smith   3/26/2017 12:12 PM   ED    Description:  Female : 1945   Department:  [facility]           Your Care was Coordinated By:     Provider Role From To    Matt Burks Jr., MD Attending Provider 17 1215 --      Reason for Visit     Dizziness     Chest Pain     Abdominal Pain           Diagnoses this Visit        Comments    Abdominal pain, unspecified location    -  Primary     Chest pain         Nausea           ED Disposition     None           To Do List           Follow-up Information     Follow up with Valencia Palencia MD. Schedule an appointment as soon as possible for a visit in 2 days.    Specialty:  Family Medicine    Why:  Please follow-up with your regular doctor this week.  Return for any new or worsening symptoms.    Contact information:    [address] [place] [address] [phone]         These Medications        Disp Refills Start End    promethazine (PHENERGAN) 25 MG tablet 15 tablet 0 3/26/2017     Take 1 tablet (25 mg total) by mouth every 6 (six) hours as needed for Nausea. - Oral    Pharmacy: [facility] - [place] [address] [address] Ph #: [phone]         [facility]     [facility] -  Assistance  Registered nurses in the [facility] provide clinical advisement, health education, appointment booking, and other advisory services.  Call for this free service at [phone].             Medications           Message regarding Medications     Verify the changes and/or additions to your medication regime listed below are the same as discussed with your clinician today.  If any of these changes or additions are incorrect, please notify your healthcare provider.        START taking these NEW medications        Refills    promethazine (PHENERGAN) 25 MG tablet 0     Sig: Take 1 tablet (25 mg total) by mouth every 6 (six) hours as needed for Nausea.    Class: Print    Route: Oral      These medications were administered today        Dose Freq    ondansetron injection 4 mg 4 mg ED 1 Time    Sig: Inject 4 mg into the vein ED 1 Time.    Class: Normal    Route: Intravenous           Verify that the below list of medications is an accurate representation of the medications you are currently taking.  If none reported, the list may be blank. If incorrect, please contact your healthcare provider. Carry this list with you in case of emergency.           Current Medications     albuterol (PROAIR HFA) 90 mcg/actuation inhaler Inhale 2 puffs into the lungs every 6 (six) hours as needed for Wheezing.    alendronate (FOSAMAX) 70 MG tablet Take 1 tablet (70 mg total) by mouth every 7 days.    aspirin (ECOTRIN) 81 MG EC tablet Take 1 tablet (81 mg total) by mouth once daily.    atorvastatin (LIPITOR) 80 MG tablet Take 1 tablet (80 mg total) by mouth once daily.    baclofen (LIORESAL) 10 MG tablet Take 1 tablet (10 mg total) by mouth 3 (three) times daily. Take half (5mg)  tablet three times a day    bimatoprost (LUMIGAN) 0.01 % Drop Place 1 drop into both eyes every evening.    calcium carbonate-vitamin D3 (CALTRATE 600 + D) 600 mg (1,500 mg)-800 unit Chew Take 2 tablets by mouth 2 (two) times daily. Pt. Takes= 1.5 tabs every AM & 1 TAB Every evening.    colesevelam (WELCHOL) 625 mg tablet Take 3 tablets (1,875 mg total) by mouth every evening.    cyclobenzaprine (FLEXERIL) 5 MG tablet Take 1 tablet (5 mg total) by mouth 3 (three) times daily as needed for Muscle spasms.    dicyclomine (BENTYL) 10 MG capsule Take 1 capsule (10 mg total) by mouth 4 (four) times daily before meals and nightly.    diphenhydrAMINE (BENADRYL) 25 mg capsule Take 1 each (25 mg total) by mouth nightly as needed for Itching.    fentaNYL (DURAGESIC) 25 mcg/hr Place 1 patch onto the skin every 72 hours.     hydrocortisone (CORTEF) 10 MG Tab Take 1 tablet (10 mg total) by mouth 2 (two) times daily.    isosorbide mononitrate (IMDUR) 30 MG 24 hr tablet Take 1 tablet (30 mg total) by mouth once daily.    levothyroxine (SYNTHROID) 112 MCG tablet Take 1 tablet (112 mcg total) by mouth once daily.    lidocaine (XYLOCAINE) 5 % Oint ointment Apply topically as needed.    metoprolol tartrate (LOPRESSOR) 50 MG tablet Take 1 tablet (50 mg total) by mouth 2 (two) times daily.    montelukast (SINGULAIR) 10 mg tablet Take 1 tablet (10 mg total) by mouth every evening.    nitroGLYCERIN (NITROSTAT) 0.4 MG SL tablet Place 1 tablet (0.4 mg total) under the tongue every 5 (five) minutes as needed for Chest pain.    pantoprazole (PROTONIX) 40 MG tablet Take 1 tablet (40 mg total) by mouth 2 (two) times daily.    potassium chloride (KLOR-CON) 10 MEQ TbSR Take 1 tablet (10 mEq total) by mouth once daily.    prasugrel (EFFIENT) 10 mg Tab Take 1 tablet (10 mg total) by mouth once daily.    promethazine (PHENERGAN) 25 MG tablet Take 1 tablet (25 mg total) by mouth every 6 (six) hours as needed for Nausea.    RANEXA 500 mg Tb12     ranolazine (RANEXA) 1,000 mg Tb12 Take 1 tablet (1,000 mg total) by mouth 2 (two) times daily.    sertraline (ZOLOFT) 50 MG tablet Take 1 tablet (50 mg total) by mouth once daily.           Clinical Reference Information           Your Vitals Were     BP Pulse Temp Resp Height Weight    166/88 84 98.4 °F (36.9 °C) (Oral) 18 5' (1.524 m) 80.7 kg (178 lb)    SpO2 BMI             96% 34.76 kg/m2         Allergies as of 3/26/2017        Reactions    Aspirin Nausea Only    Dilaudid [Hydromorphone (Bulk)] Itching    Rash , swelling of throat.    Iodine And Iodide Containing Products Other (See Comments)    Closing of Throat. No Shellfish of any kind. Can't eat certain kinds of Regular Fish, ie, can't eat: Tuna, Swordfish, [place]=} these are the major fishes she can't eat.    Morphine Itching, Nausea And Vomiting,  Hallucinations    Oxycodone Itching, Swelling    Swelling of Throat.    Penicillins Itching, Rash    Percocet [Oxycodone-acetaminophen] Itching, Swelling    Cannot tolerate this medication. Swelling of Throat.    Shellfish Containing Products Other (See Comments)    Closing of Throat. No Shellfish of any kind. Can't eat certain kinds of Regular Fish, ie, can't eat: Tuna, Swordfish, [place]=} these are the major fishes she can't eat.    Valium [Diazepam] Hives, Itching    Vicodin [Hydrocodone-acetaminophen] Itching, Nausea Only, Rash    Codeine Itching, Nausea Only, Rash    Demerol [Meperidine] Itching, Rash      Immunizations Administered on Date of Encounter - 3/26/2017     None      ED Micro, Lab, POCT     Start Ordered       Status Ordering Provider    03/26/17 1253 03/26/17 1252  Troponin I  STAT      Final result     03/26/17 1253 03/26/17 1252  Brain natriuretic peptide  STAT      Final result     03/26/17 1253 03/26/17 1252  Urinalysis  STAT      Final result     03/26/17 1245 03/26/17 1245  CBC auto differential  Once      Final result     03/26/17 1245 03/26/17 1245  Comprehensive metabolic panel  STAT      Final result       ED Imaging Orders     Start Ordered       Status Ordering Provider    03/26/17 1251 03/26/17 1245  CT Abdomen Pelvis  Without Contrast  1 time imaging      Final result     03/26/17 1245 03/26/17 1245  X-Ray Chest AP Portable  1 time imaging      Final result     03/26/17 1245 03/26/17 1245    1 time imaging,   Status:  Canceled      Canceled       Discharge References/Attachments     CHEST PAIN, UNCERTAIN CAUSE (ENGLISH)    VOMITING OR DIARRHEA (ADULT), DIET FOR (ENGLISH)      Your Scheduled Appointments     Apr 12, 2017 11:00 AM CDT   Infusion 15 Min with CHAIR [facility]    [address] [place] [id number] [phone]            Apr 13, 2017  3:00 PM CDT   GASTROENTEROLOGY ESTABLISHED PATIENT with Thang Marsh MD    [facility]onz[facility] Rae - Gastroenterology (Daev Rae )    [address]  [facility] [address] [phone]            May 16, 2017 10:15 AM CDT   [facility] with PERIMETRY, ASHLYN Sánchez Sukumarjavier - Ophthalmology (Dave Rae )    [address]  [place] [address] [phone]            May 16, 2017 10:45 AM CDT   Established Patient Visit with MD Gonzalo Spicer - Ophthalmology (Dave javier )    [address]  [place] [address] [phone]            May 24, 2017 10:30 AM CDT   Neurology - Established Patient with MD Gonzalo Malhotra II - Neurology (Dave Hw[address] )    [address]  [place] [address] [phone]              Smoking Cessation     If you would like to quit smoking:   You may be eligible for free services if you are a [place] resident and started smoking cigarettes before September 1, 1988.  Call the Smoking Cessation Trust (SCT) toll free at (116) 245-6889 or (842) 700-9937.   Call 1-800-QUIT-NOW if you do not meet the above criteria.             [facility] complies with applicable Federal civil rights laws and does not discriminate on the basis of race, color, national origin, age, disability, or sex.        Language Assistance Services     ATTENTION: Language assistance services are available, free of charge. Please call [phone].      ATENCIÓN: Si habla español, tiene a avilez disposición servicios gratuitos de asistencia lingüística. Llame al [phone].     CHÚ Ý: N?u b?n nói Ti?ng Vi?t, có các d?ch v? h? tr? ngôn ng? mi?n phí dành cho b?n. G?i s? [phone].

## 2017-04-10 ENCOUNTER — HOSPITAL ENCOUNTER (EMERGENCY)
Facility: HOSPITAL | Age: 72
Discharge: PSYCHIATRIC OR SUBSTANCE USE FACILITY | End: 2017-04-10
Attending: EMERGENCY MEDICINE
Payer: MEDICARE

## 2017-04-10 ENCOUNTER — OUTPATIENT CASE MANAGEMENT (OUTPATIENT)
Dept: ADMINISTRATIVE | Facility: OTHER | Age: 72
End: 2017-04-10

## 2017-04-10 VITALS
HEART RATE: 62 BPM | TEMPERATURE: 98 F | SYSTOLIC BLOOD PRESSURE: 111 MMHG | HEIGHT: 60 IN | BODY MASS INDEX: 31.8 KG/M2 | DIASTOLIC BLOOD PRESSURE: 55 MMHG | OXYGEN SATURATION: 95 % | RESPIRATION RATE: 18 BRPM | WEIGHT: 162 LBS

## 2017-04-10 DIAGNOSIS — R07.89 CHEST PAIN, NON-CARDIAC: ICD-10-CM

## 2017-04-10 DIAGNOSIS — R10.84 ABDOMINAL PAIN, GENERALIZED: ICD-10-CM

## 2017-04-10 DIAGNOSIS — R46.89 BEHAVIORAL CHANGE: Primary | ICD-10-CM

## 2017-04-10 DIAGNOSIS — F01.518 VASCULAR DEMENTIA WITH BEHAVIOR DISTURBANCE: ICD-10-CM

## 2017-04-10 LAB
ALBUMIN SERPL BCP-MCNC: 3.7 G/DL
ALP SERPL-CCNC: 74 U/L
ALT SERPL W/O P-5'-P-CCNC: 11 U/L
AMYLASE SERPL-CCNC: 80 U/L
ANION GAP SERPL CALC-SCNC: 8 MMOL/L
ANISOCYTOSIS BLD QL SMEAR: SLIGHT
APTT BLDCRRT: 37.5 SEC
AST SERPL-CCNC: 16 U/L
BASOPHILS NFR BLD: 0 %
BILIRUB SERPL-MCNC: 0.3 MG/DL
BILIRUB UR QL STRIP: NEGATIVE
BUN SERPL-MCNC: 16 MG/DL
BURR CELLS BLD QL SMEAR: ABNORMAL
CALCIUM SERPL-MCNC: 10.1 MG/DL
CHLORIDE SERPL-SCNC: 108 MMOL/L
CK SERPL-CCNC: 152 U/L
CLARITY UR: CLEAR
CO2 SERPL-SCNC: 24 MMOL/L
COLOR UR: YELLOW
CREAT SERPL-MCNC: 0.9 MG/DL
DIFFERENTIAL METHOD: ABNORMAL
EOSINOPHIL NFR BLD: 7 %
ERYTHROCYTE [DISTWIDTH] IN BLOOD BY AUTOMATED COUNT: 15.6 %
EST. GFR  (AFRICAN AMERICAN): >60 ML/MIN/1.73 M^2
EST. GFR  (NON AFRICAN AMERICAN): >60 ML/MIN/1.73 M^2
GLUCOSE SERPL-MCNC: 92 MG/DL
GLUCOSE UR QL STRIP: NEGATIVE
HCT VFR BLD AUTO: 35.5 %
HGB BLD-MCNC: 11.4 G/DL
HGB UR QL STRIP: NEGATIVE
INR PPP: 1.1
KETONES UR QL STRIP: NEGATIVE
LACTATE SERPL-SCNC: 0.8 MMOL/L
LEUKOCYTE ESTERASE UR QL STRIP: NEGATIVE
LIPASE SERPL-CCNC: 70 U/L
LYMPHOCYTES NFR BLD: 36 %
MCH RBC QN AUTO: 28.4 PG
MCHC RBC AUTO-ENTMCNC: 32.1 %
MCV RBC AUTO: 88 FL
MONOCYTES NFR BLD: 6 %
NEUTROPHILS NFR BLD: 51 %
NITRITE UR QL STRIP: NEGATIVE
OVALOCYTES BLD QL SMEAR: ABNORMAL
PH UR STRIP: 6 [PH] (ref 5–8)
PLATELET # BLD AUTO: 490 K/UL
PMV BLD AUTO: 9.9 FL
POIKILOCYTOSIS BLD QL SMEAR: SLIGHT
POTASSIUM SERPL-SCNC: 4.4 MMOL/L
PROT SERPL-MCNC: 7.2 G/DL
PROT UR QL STRIP: NEGATIVE
PROTHROMBIN TIME: 11.2 SEC
RBC # BLD AUTO: 4.02 M/UL
SODIUM SERPL-SCNC: 140 MMOL/L
SP GR UR STRIP: 1.01 (ref 1–1.03)
T4 FREE SERPL-MCNC: 1.04 NG/DL
TROPONIN I SERPL DL<=0.01 NG/ML-MCNC: <0.006 NG/ML
TSH SERPL DL<=0.005 MIU/L-ACNC: <0.01 UIU/ML
URN SPEC COLLECT METH UR: NORMAL
UROBILINOGEN UR STRIP-ACNC: NEGATIVE EU/DL
WBC # BLD AUTO: 11.78 K/UL

## 2017-04-10 PROCEDURE — 84443 ASSAY THYROID STIM HORMONE: CPT

## 2017-04-10 PROCEDURE — 85730 THROMBOPLASTIN TIME PARTIAL: CPT

## 2017-04-10 PROCEDURE — 96375 TX/PRO/DX INJ NEW DRUG ADDON: CPT

## 2017-04-10 PROCEDURE — 96372 THER/PROPH/DIAG INJ SC/IM: CPT

## 2017-04-10 PROCEDURE — 85025 COMPLETE CBC W/AUTO DIFF WBC: CPT

## 2017-04-10 PROCEDURE — 82550 ASSAY OF CK (CPK): CPT

## 2017-04-10 PROCEDURE — 83605 ASSAY OF LACTIC ACID: CPT

## 2017-04-10 PROCEDURE — 96376 TX/PRO/DX INJ SAME DRUG ADON: CPT

## 2017-04-10 PROCEDURE — 81003 URINALYSIS AUTO W/O SCOPE: CPT

## 2017-04-10 PROCEDURE — P9612 CATHETERIZE FOR URINE SPEC: HCPCS

## 2017-04-10 PROCEDURE — 96374 THER/PROPH/DIAG INJ IV PUSH: CPT

## 2017-04-10 PROCEDURE — 25000003 PHARM REV CODE 250: Performed by: EMERGENCY MEDICINE

## 2017-04-10 PROCEDURE — 82150 ASSAY OF AMYLASE: CPT

## 2017-04-10 PROCEDURE — 83690 ASSAY OF LIPASE: CPT

## 2017-04-10 PROCEDURE — 80053 COMPREHEN METABOLIC PANEL: CPT

## 2017-04-10 PROCEDURE — 84484 ASSAY OF TROPONIN QUANT: CPT

## 2017-04-10 PROCEDURE — 84439 ASSAY OF FREE THYROXINE: CPT

## 2017-04-10 PROCEDURE — 99285 EMERGENCY DEPT VISIT HI MDM: CPT | Mod: 25

## 2017-04-10 PROCEDURE — 63600175 PHARM REV CODE 636 W HCPCS: Performed by: EMERGENCY MEDICINE

## 2017-04-10 PROCEDURE — 85610 PROTHROMBIN TIME: CPT

## 2017-04-10 RX ORDER — HEPARIN 100 UNIT/ML
500 SYRINGE INTRAVENOUS
Status: COMPLETED | OUTPATIENT
Start: 2017-04-10 | End: 2017-04-10

## 2017-04-10 RX ORDER — DICYCLOMINE HYDROCHLORIDE 10 MG/ML
20 INJECTION INTRAMUSCULAR
Status: COMPLETED | OUTPATIENT
Start: 2017-04-10 | End: 2017-04-10

## 2017-04-10 RX ORDER — DIPHENHYDRAMINE HYDROCHLORIDE 50 MG/ML
50 INJECTION INTRAMUSCULAR; INTRAVENOUS
Status: DISCONTINUED | OUTPATIENT
Start: 2017-04-10 | End: 2017-04-10

## 2017-04-10 RX ORDER — LORAZEPAM 2 MG/ML
2 INJECTION INTRAMUSCULAR
Status: COMPLETED | OUTPATIENT
Start: 2017-04-10 | End: 2017-04-10

## 2017-04-10 RX ORDER — PROCHLORPERAZINE EDISYLATE 5 MG/ML
10 INJECTION INTRAMUSCULAR; INTRAVENOUS ONCE
Status: COMPLETED | OUTPATIENT
Start: 2017-04-10 | End: 2017-04-10

## 2017-04-10 RX ORDER — DIPHENHYDRAMINE HYDROCHLORIDE 50 MG/ML
50 INJECTION INTRAMUSCULAR; INTRAVENOUS
Status: COMPLETED | OUTPATIENT
Start: 2017-04-10 | End: 2017-04-10

## 2017-04-10 RX ORDER — FENTANYL CITRATE 50 UG/ML
50 INJECTION, SOLUTION INTRAMUSCULAR; INTRAVENOUS
Status: COMPLETED | OUTPATIENT
Start: 2017-04-10 | End: 2017-04-10

## 2017-04-10 RX ORDER — PANTOPRAZOLE SODIUM 40 MG/1
80 TABLET, DELAYED RELEASE ORAL
Status: COMPLETED | OUTPATIENT
Start: 2017-04-10 | End: 2017-04-10

## 2017-04-10 RX ORDER — ZIPRASIDONE MESYLATE 20 MG/ML
20 INJECTION, POWDER, LYOPHILIZED, FOR SOLUTION INTRAMUSCULAR
Status: COMPLETED | OUTPATIENT
Start: 2017-04-10 | End: 2017-04-10

## 2017-04-10 RX ORDER — ZIPRASIDONE HYDROCHLORIDE 20 MG/1
20 CAPSULE ORAL
Status: DISCONTINUED | OUTPATIENT
Start: 2017-04-10 | End: 2017-04-10

## 2017-04-10 RX ADMIN — FENTANYL CITRATE 50 MCG: 50 INJECTION, SOLUTION INTRAMUSCULAR; INTRAVENOUS at 10:04

## 2017-04-10 RX ADMIN — DICYCLOMINE HYDROCHLORIDE 20 MG: 20 INJECTION, SOLUTION INTRAMUSCULAR at 10:04

## 2017-04-10 RX ADMIN — DIPHENHYDRAMINE HYDROCHLORIDE 50 MG: 50 INJECTION, SOLUTION INTRAMUSCULAR; INTRAVENOUS at 10:04

## 2017-04-10 RX ADMIN — LORAZEPAM 2 MG: 2 INJECTION, SOLUTION INTRAMUSCULAR; INTRAVENOUS at 01:04

## 2017-04-10 RX ADMIN — PANTOPRAZOLE SODIUM 80 MG: 40 TABLET, DELAYED RELEASE ORAL at 10:04

## 2017-04-10 RX ADMIN — PROCHLORPERAZINE EDISYLATE 10 MG: 5 INJECTION INTRAMUSCULAR; INTRAVENOUS at 10:04

## 2017-04-10 RX ADMIN — ZIPRASIDONE MESYLATE 20 MG: 20 INJECTION, POWDER, LYOPHILIZED, FOR SOLUTION INTRAMUSCULAR at 12:04

## 2017-04-10 RX ADMIN — Medication 500 UNITS: at 03:04

## 2017-04-10 RX ADMIN — DIPHENHYDRAMINE HYDROCHLORIDE 50 MG: 50 INJECTION, SOLUTION INTRAMUSCULAR; INTRAVENOUS at 12:04

## 2017-04-10 NOTE — ED NOTES
Pt is attempteing to get out of bed and is is attempting to harm staff. Dr Amor notified and restraint order requested. PT states she is at a [facility] and that she drove herself here and is demanding to get her keys so she can leave. Pt is informed that she was brought here by ambulance and that she did not drive herself. Pt became very angry at his point. Security was called to the bedside.

## 2017-04-10 NOTE — ED AVS SNAPSHOT
[facility]  [address] [place] [address]               Olga Smith   4/10/2017  9:00 AM   ED    Description:  Female : 1945   Department:  [facility]           Your Care was Coordinated By:     Provider Role From To    Jesus Amor MD Attending Provider 04/10/17 0917 --      Reason for Visit     Altered Mental Status           Diagnoses this Visit        Comments    Behavioral change    -  Primary     Vascular dementia with behavior disturbance         Chest pain, non-cardiac         Abdominal pain, generalized           ED Disposition     ED Disposition Condition Comment    Discharge             To Do List           Follow-up Information     Follow up with Valencia Palencia MD In 3 days.    Specialty:  Family Medicine    Contact information:    [address]  [place] [id number] [phone]        [facility]     [facility] -  Assistance  Unless otherwise directed by your provider, please contact [facility], our nurse care line that is available for  assistance.     Registered nurses in the [facility] provide: appointment scheduling, clinical advisement, health education, and other advisory services.  Call: [phone] (toll free)               Medications           Message regarding Medications     Verify the changes and/or additions to your medication regime listed below are the same as discussed with your clinician today.  If any of these changes or additions are incorrect, please notify your healthcare provider.        These medications were administered today        Dose Freq    pantoprazole EC tablet 80 mg 80 mg ED 1 Time    Sig: Take 2 tablets (80 mg total) by mouth ED 1 Time.    Class: Normal    Route: Oral    dicyclomine injection 20 mg 20 mg ED 1 Time    Sig: Inject 2 mLs (20 mg total) into the muscle ED 1 Time.    Class: Normal    Route: Intramuscular     prochlorperazine injection Soln 10 mg 10 mg Once    Sig: Inject 2 mLs (10 mg total) into the vein once.    Class: Normal    Route: Intravenous    diphenhydrAMINE injection 50 mg 50 mg ED 1 Time    Sig: Inject 1 mL (50 mg total) into the vein ED 1 Time.    Class: Normal    Route: Intravenous    fentaNYL 50 mcg/mL injection 50 mcg 50 mcg ED 1 Time    Sig: Inject 1 mL (50 mcg total) into the vein ED 1 Time.    Class: Normal    Route: Intravenous    ziprasidone injection 20 mg 20 mg ED 1 Time    Sig: Inject 20 mg into the muscle ED 1 Time.    Class: Normal    Route: Intramuscular    diphenhydrAMINE injection 50 mg 50 mg ED 1 Time    Sig: Inject 1 mL (50 mg total) into the vein ED 1 Time.    Class: Normal    Route: Intravenous    lorazepam injection 2 mg 2 mg ED 1 Time    Sig: Inject 1 mL (2 mg total) into the vein ED 1 Time.    Class: Normal    Route: Intravenous           Verify that the below list of medications is an accurate representation of the medications you are currently taking.  If none reported, the list may be blank. If incorrect, please contact your healthcare provider. Carry this list with you in case of emergency.           Current Medications     albuterol (PROAIR HFA) 90 mcg/actuation inhaler Inhale 2 puffs into the lungs every 6 (six) hours as needed for Wheezing.    alendronate (FOSAMAX) 70 MG tablet Take 1 tablet (70 mg total) by mouth every 7 days.    aspirin (ECOTRIN) 81 MG EC tablet Take 1 tablet (81 mg total) by mouth once daily.    atorvastatin (LIPITOR) 80 MG tablet Take 1 tablet (80 mg total) by mouth once daily.    baclofen (LIORESAL) 10 MG tablet Take 1 tablet (10 mg total) by mouth 3 (three) times daily. Take half (5mg)  tablet three times a day    bimatoprost (LUMIGAN) 0.01 % Drop Place 1 drop into both eyes every evening.    calcium carbonate-vitamin D3 (CALTRATE 600 + D) 600 mg (1,500 mg)-800 unit Chew Take 2 tablets by mouth 2 (two) times daily. Pt. Takes= 1.5 tabs every AM & 1 TAB  Every evening.    colesevelam (WELCHOL) 625 mg tablet Take 3 tablets (1,875 mg total) by mouth every evening.    cyclobenzaprine (FLEXERIL) 5 MG tablet Take 1 tablet (5 mg total) by mouth 3 (three) times daily as needed for Muscle spasms.    dicyclomine (BENTYL) 10 MG capsule Take 1 capsule (10 mg total) by mouth 4 (four) times daily before meals and nightly.    diphenhydrAMINE (BENADRYL) 25 mg capsule Take 1 each (25 mg total) by mouth nightly as needed for Itching.    fentaNYL (DURAGESIC) 25 mcg/hr Place 1 patch onto the skin every 72 hours.    hydrocortisone (CORTEF) 10 MG Tab Take 1 tablet (10 mg total) by mouth 2 (two) times daily.    isosorbide mononitrate (IMDUR) 30 MG 24 hr tablet Take 1 tablet (30 mg total) by mouth once daily.    levothyroxine (SYNTHROID) 112 MCG tablet Take 1 tablet (112 mcg total) by mouth once daily.    lidocaine (XYLOCAINE) 5 % Oint ointment Apply topically as needed.    metoprolol tartrate (LOPRESSOR) 50 MG tablet Take 1 tablet (50 mg total) by mouth 2 (two) times daily.    montelukast (SINGULAIR) 10 mg tablet Take 1 tablet (10 mg total) by mouth every evening.    nitroGLYCERIN (NITROSTAT) 0.4 MG SL tablet Place 1 tablet (0.4 mg total) under the tongue every 5 (five) minutes as needed for Chest pain.    pantoprazole (PROTONIX) 40 MG tablet Take 1 tablet (40 mg total) by mouth 2 (two) times daily.    potassium chloride (KLOR-CON) 10 MEQ TbSR Take 1 tablet (10 mEq total) by mouth once daily.    prasugrel (EFFIENT) 10 mg Tab Take 1 tablet (10 mg total) by mouth once daily.    promethazine (PHENERGAN) 25 MG tablet Take 1 tablet (25 mg total) by mouth every 6 (six) hours as needed for Nausea.    RANEXA 500 mg Tb12     ranolazine (RANEXA) 1,000 mg Tb12 Take 1 tablet (1,000 mg total) by mouth 2 (two) times daily.    sertraline (ZOLOFT) 50 MG tablet Take 1 tablet (50 mg total) by mouth once daily.           Clinical Reference Information           Your Vitals Were     BP Pulse Temp Resp  Height Weight    111/55 62 97.5 °F (36.4 °C) 18 5' (1.524 m) 73.5 kg (162 lb)    SpO2 BMI             95% 31.64 kg/m2         Allergies as of 4/10/2017        Reactions    Aspirin Nausea Only    Dilaudid [Hydromorphone (Bulk)] Itching    Rash , swelling of throat.    Iodine And Iodide Containing Products Other (See Comments)    Closing of Throat. No Shellfish of any kind. Can't eat certain kinds of Regular Fish, ie, can't eat: Tuna, Swordfish, [place]=} these are the major fishes she can't eat.    Morphine Itching, Nausea And Vomiting, Hallucinations    Oxycodone Itching, Swelling    Swelling of Throat.    Penicillins Itching, Rash    Percocet [Oxycodone-acetaminophen] Itching, Swelling    Cannot tolerate this medication. Swelling of Throat.    Shellfish Containing Products Other (See Comments)    Closing of Throat. No Shellfish of any kind. Can't eat certain kinds of Regular Fish, ie, can't eat: Tuna, Swordfish, [place]=} these are the major fishes she can't eat.    Valium [Diazepam] Hives, Itching    Vicodin [Hydrocodone-acetaminophen] Itching, Nausea Only, Rash    Codeine Itching, Nausea Only, Rash    Demerol [Meperidine] Itching, Rash      Immunizations Administered on Date of Encounter - 4/10/2017     None      ED Micro, Lab, POCT     Start Ordered       Status Ordering Provider    04/10/17 0933 04/10/17 0932  Urinalysis Catheterized  STAT      Final result     04/10/17 0932 04/10/17 0932  CPK  STAT      Final result     04/10/17 0932 04/10/17 0932  Lactic acid, plasma  STAT      Final result     04/10/17 0932 04/10/17 0932  TSH  STAT      Final result     04/10/17 0932 04/10/17 0932  Troponin I  STAT      Final result     04/10/17 0932 04/10/17 0932  Lipase  STAT      Final result     04/10/17 0932 04/10/17 0932  Amylase  STAT      Final result     04/10/17 0932 04/10/17 0932  Protime-INR  STAT      Final result     04/10/17 0932 04/10/17 0932  APTT  STAT      Final result     04/10/17 0932 04/10/17 0932   Comprehensive metabolic panel  STAT      Final result     04/10/17 0932 04/10/17 0932  CBC auto differential  STAT      Final result     04/10/17 0932 04/10/17 0932  T4, free  Once      Final result       ED Imaging Orders     Start Ordered       Status Ordering Provider    04/10/17 1153 04/10/17 1152  CT Head Without Contrast  1 time imaging      Final result     04/10/17 0933 04/10/17 0933  CT Abdomen Pelvis  Without Contrast  1 time imaging      Final result     04/10/17 0932 04/10/17 0932  X-Ray Chest AP Portable  1 time imaging      Final result         Discharge Instructions       Please have the patient return if she gets worse or if new problems develop, especially fever.  Usual medicines.  Rest.    Your Scheduled Appointments     Apr 12, 2017 11:00 AM CDT   Infusion 15 Min with CHAIR 07 [facility] ([facility])    [address] [place] [address]   [phone]            Apr 13, 2017  3:00 PM CDT   GASTROENTEROLOGY ESTABLISHED PATIENT with MD Gonzalo Wasserman - Gastroenterology ([facility] )    [address]  [place] [id number] [phone]            May 16, 2017 10:15 AM CDT   [facility] with PERIMETRY, ASHLYN Rae - Ophthalmology ([facility] )    [address]  [place] [id number] [phone]            May 16, 2017 10:45 AM CDT   Established Patient Visit with MD Gonzalo Spicer - Ophthalmology ([facility] )    [address]  [place] [id number] [phone]            May 24, 2017 10:30 AM CDT   Neurology - Established Patient with MD Gonzalo Malhotra II - Neurology ([facility] )    [address]  [place] [id number] [phone]              Smoking Cessation     If you would like to quit smoking:   You may be eligible for free services if you are a [place] resident and started smoking  cigarettes before September 1, 1988.  Call the Smoking Cessation Trust (SCT) toll free at (485) 183-8324 or (641) 740-6663.   Call 1-800-QUIT-NOW if you do not meet the above criteria.   Contact us via email: [email or web address]   View our website for more information: [email or web address]         [facility] complies with applicable Federal civil rights laws and does not discriminate on the basis of race, color, national origin, age, disability, or sex.        Language Assistance Services     ATTENTION: Language assistance services are available, free of charge. Please call [phone].      ATENCIÓN: Si habla español, tiene a avilez disposición servicios gratuitos de asistencia lingüística. Llame al [phone].     CHÚ Ý: N?u b?n nói Ti?ng Vi?t, có các d?ch v? h? tr? ngôn ng? mi?n phí dành cho b?n. G?i s? [phone].

## 2017-04-10 NOTE — DISCHARGE INSTRUCTIONS
Please have the patient return if she gets worse or if new problems develop, especially fever.  Usual medicines.  Rest.

## 2017-04-10 NOTE — ED TRIAGE NOTES
Patient presented to ED via EMS on stretcher from [facility] with complaints of generalized weakness and pain. Patient states that she started to feel bad after this morning meds at about [id number].

## 2017-04-10 NOTE — ED PROVIDER NOTES
Encounter Date: 4/10/2017    SCRIBE #1 NOTE: I, Ulysses Chowdhury, am scribing for, and in the presence of,  Jesus Amor MD. I have scribed the following portions of the note - Other sections scribed: HPI, ROS.       History     Chief Complaint   Patient presents with    Altered Mental Status     Haugen states she has not been herself, confused.       Review of patient's allergies indicates:   Allergen Reactions    Aspirin Nausea Only    Dilaudid [hydromorphone (bulk)] Itching     Rash , swelling of throat.    Iodine and iodide containing products Other (See Comments)     Closing of Throat. No Shellfish of any kind. Can't eat certain kinds of Regular Fish, ie, can't eat: Tuna, Swordfish, [place]=} these are the major fishes she can't eat.    Morphine Itching, Nausea And Vomiting and Hallucinations    Oxycodone Itching and Swelling     Swelling of Throat.    Penicillins Itching and Rash    Percocet [oxycodone-acetaminophen] Itching and Swelling     Cannot tolerate this medication. Swelling of Throat.    Shellfish containing products Other (See Comments)     Closing of Throat. No Shellfish of any kind. Can't eat certain kinds of Regular Fish, ie, can't eat: Tuna, Swordfish, [employer] [place]=} these are the major fishes she can't eat.      Valium [diazepam] Hives and Itching    Vicodin [hydrocodone-acetaminophen] Itching, Nausea Only and Rash    Codeine Itching, Nausea Only and Rash    Demerol [meperidine] Itching and Rash     HPI Comments: CC: Altered Mental Status     HPI: This 72 y.o. female with a PMHx of IBS, COPD, asthma, HTN, fibromyalgia, glaucoma, CAD, osteoporosis, craniopharyngioma, pacemaker, thyroid disease, MI with stents presents to the ED via EMS c/o generalized body weakness and pain after taking her morning meds at about 5am. Pt also c/o worsening chest pain and SOB since last night that is exacerbated by breathing. Pt also c/o slight cough, abdominal pain, chills, headache R>L, eye pain, arm and leg  pain, and dysuria. No at home medication attempted.             The history is provided by the patient and the EMS personnel.     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    Encounter for blood transfusion     Extravasation injury of IV catheter site with other complication 2008    Pain remains to left forearm, no IV sticks    Fibromyalgia     Glaucoma     Hypertension     Irritable bowel syndrome (IBS)     MI (myocardial infarction)     Osteoporosis 8/7/2015    Pacemaker     Thyroid disease      Past Surgical History:   Procedure Laterality Date    ABDOMINAL SURGERY      CARDIAC SURGERY      stents and pacemaker    COLONOSCOPY N/A 9/19/2016    Procedure: COLONOSCOPY;  Surgeon: Lisandro Kaba MD;  [facility];  Service: Endoscopy;  Laterality: N/A;  OUT PATIENT [facility] GI/CAN'T DO PROCEDURE AT [facility], HAS TO COME HERE    HERNIA REPAIR      HYSTERECTOMY      SMALL INTESTINE SURGERY      TRACHEOSTOMY TUBE PLACEMENT      pt kept x 8.5 years. Removed during 2009     Family History   Problem Relation Age of Onset    Cataracts Brother     Glaucoma Brother     Glaucoma Mother     Lung cancer Mother     Coronary artery disease Mother     Coronary artery disease Father     Cataracts Sister     Glaucoma Sister     No Known Problems Maternal Aunt     No Known Problems Maternal Uncle     No Known Problems Paternal Aunt     No Known Problems Paternal Uncle     No Known Problems Maternal Grandmother     No Known Problems Maternal Grandfather     No Known Problems Paternal Grandmother     No Known Problems Paternal Grandfather     Colon cancer Neg Hx     Colon polyps Neg Hx     Amblyopia Neg Hx     Blindness Neg Hx     Cancer Neg Hx     Diabetes Neg Hx     Hypertension Neg Hx     Macular degeneration Neg Hx     Retinal detachment Neg Hx      Strabismus Neg Hx     Stroke Neg Hx     Thyroid disease Neg Hx      Social History   Substance Use Topics    Smoking status: Former Smoker     Packs/day: 1.00     Years: 25.00     Types: Cigarettes     Quit date: 11/9/1985    Smokeless tobacco: Never Used    Alcohol use No     Review of Systems   Constitutional: Positive for chills. Negative for fever.   HENT: Negative for congestion and sore throat.    Eyes: Positive for pain.   Respiratory: Positive for cough and shortness of breath.    Cardiovascular: Positive for chest pain.   Gastrointestinal: Positive for abdominal pain. Negative for diarrhea, nausea and vomiting.   Genitourinary: Positive for dysuria.   Musculoskeletal: Positive for myalgias (generalized). Negative for back pain.   Skin: Negative for rash.   Neurological: Positive for weakness and headaches.   Hematological: Does not bruise/bleed easily.   Psychiatric/Behavioral: Positive for confusion.       Physical Exam   Initial Vitals   BP Pulse Resp Temp SpO2   04/10/17 0857 04/10/17 0857 04/10/17 0857 04/10/17 0857 04/10/17 0857   156/75 72 18 97.5 °F (36.4 °C) 100 %     Physical Exam  The patient was examined specifically for the following:   General:No significant distress, Good color, Warm and dry. Head and neck:Scalp atraumatic, Neck supple. Neurological:Appropriate conversation, Gross motor deficits. Eyes:Conjugate gaze, Clear corneas. ENT: No epistaxis. Cardiac: Regular rate and rhythm, Grossly normal heart tones. Pulmonary: Wheezing, Rales. Gastrointestinal: Abdominal tenderness, Abdominal distention. Musculoskeletal: Extremity deformity, Apparent pain with range of motion of the joints. Skin: Rash.   The findings on examination were normal except for the following: Vital signs are stable.  There is no evidence respiratory distress.  Lungs are clear and free wheezing rales of the rhonchi.  Heart tones are normal.  The patient is regular rate and rhythm.  The chest is exquisitely tender  across the entire precordium.  The abdomen is exquisitely tender generally.  Heart tones are normal.  The patient has regular rate and rhythm.  The patient is afebrile.  There is mild swelling of both lower extremities.  The lungs are clear and free wheezing rales of the rhonchi.   ED Course   Critical Care  Date/Time: 4/11/2017 1:37 PM  Performed by: ROSE RUDOLPH  Authorized by: ROSE RUDOLPH   Direct patient critical care time: 26 minutes  Additional history critical care time: 11 minutes  Ordering / reviewing critical care time: 9 minutes  Documentation critical care time: 17 minutes  Consulting other physicians critical care time: 3 minutes  Consult with family critical care time: 3 minutes  Total critical care time (exclusive of procedural time) : 69 minutes  Critical care was necessary to treat or prevent imminent or life-threatening deterioration of the following conditions: CNS failure or compromise and metabolic crisis.  Critical care was time spent personally by me on the following activities: development of treatment plan with patient or surrogate, examination of patient, ordering and review of laboratory studies, re-evaluation of patient's condition, pulse oximetry, ordering and performing treatments and interventions, evaluation of patient's response to treatment, review of old charts, ordering and review of radiographic studies, obtaining history from patient or surrogate and discussions with consultants.        Labs Reviewed   TSH - Abnormal; Notable for the following:        Result Value    TSH <0.010 (*)     All other components within normal limits   LIPASE - Abnormal; Notable for the following:     Lipase 70 (*)     All other components within normal limits   APTT - Abnormal; Notable for the following:     aPTT 37.5 (*)     All other components within normal limits   CBC W/ AUTO DIFFERENTIAL - Abnormal; Notable for the following:     Hemoglobin 11.4 (*)     Hematocrit 35.5 (*)     RDW 15.6  (*)     Platelets 490 (*)     All other components within normal limits   CK   LACTIC ACID, PLASMA   TROPONIN I   AMYLASE   PROTIME-INR   COMPREHENSIVE METABOLIC PANEL   URINALYSIS   T4, FREE     EKG Readings: (Independently Interpreted)   This patient's EKG reveals a normal sinus rhythm with a heart rate is 66.  The MO QRS and QT intervals are normal.  There is no evidence of acute myocardial infarction or malignant arrhythmia.  There are some nonspecific T-wave changes.       X-Rays:   Independently Interpreted Readings:   Other Readings:  CT of the head fails to reveal significant abnormalities.  Chest x-ray fails to reveal pneumothorax pneumonia pleural effusion.  CT the abdomen fails to reveal significant abnormalities.    Medical decision making: Given the above, this patient presents to the emergency room with an evaluation for behavior change.  The patient has a known history of dementia.  No focal neurologic deficits were identified.  Patient presented complaining of pain in her entire body.  She had abdominal tenderness.  Her CT was negative.  She had chest tenderness.  Chest x-ray EKG and troponin are unremarkable.  Patient has a history of thyroid disease her T4 is normal.  She has a slightly low TSH.  There is no evidence of urinary tract infection or other significant laboratory abnormalities to suggest infection or electrolyte abnormalities that might produce this change.  She has a very mild anemia.  The patient was seen briefly by psychiatry who felt that she could be discharged outpatient evaluation and treatment.  I agree.  The patient had to be sedated with Geodon and Ativan and Benadryl to control her for studies.  I believe this is progressive dementia.  I think that her presentation is most likely dementia with behavioral disorder.  I discussed this case with Dr. Soto psychiatry.               Scribe Attestation:   Scribe #1: I performed the above scribed service and the documentation  accurately describes the services I performed. I attest to the accuracy of the note.    Attending Attestation:           Physician Attestation for Scribe:  Physician Attestation Statement for Scribe #1: I, Jesus Amor MD, reviewed documentation, as scribed by Ulysses Chowdhury  in my presence, and it is both accurate and complete.                 ED Course           Diagnosis:  Dementia with behavioral disorder  Chest pain noncardiac  Abdominal pain, uncertain etiology      Jesus Amor MD  04/11/17 1672

## 2017-04-15 ENCOUNTER — HOSPITAL ENCOUNTER (EMERGENCY)
Facility: HOSPITAL | Age: 72
Discharge: HOME | End: 2017-04-15
Attending: EMERGENCY MEDICINE
Payer: MEDICARE

## 2017-04-15 VITALS
BODY MASS INDEX: 29.95 KG/M2 | HEART RATE: 66 BPM | SYSTOLIC BLOOD PRESSURE: 132 MMHG | WEIGHT: 169 LBS | DIASTOLIC BLOOD PRESSURE: 74 MMHG | RESPIRATION RATE: 18 BRPM | OXYGEN SATURATION: 99 % | TEMPERATURE: 99 F | HEIGHT: 63 IN

## 2017-04-15 DIAGNOSIS — R07.9 CHEST PAIN: ICD-10-CM

## 2017-04-15 DIAGNOSIS — R93.89 ABNORMAL CXR: ICD-10-CM

## 2017-04-15 DIAGNOSIS — R79.89 ELEVATED D-DIMER: Primary | ICD-10-CM

## 2017-04-15 LAB
ALBUMIN SERPL BCP-MCNC: 3.4 G/DL
ALP SERPL-CCNC: 58 U/L
ALT SERPL W/O P-5'-P-CCNC: 13 U/L
ANION GAP SERPL CALC-SCNC: 7 MMOL/L
APTT BLDCRRT: 51.6 SEC
AST SERPL-CCNC: 15 U/L
BASOPHILS # BLD AUTO: 0.01 K/UL
BASOPHILS NFR BLD: 0.1 %
BILIRUB SERPL-MCNC: 0.3 MG/DL
BNP SERPL-MCNC: 105 PG/ML
BUN SERPL-MCNC: 13 MG/DL
CALCIUM SERPL-MCNC: 9.8 MG/DL
CHLORIDE SERPL-SCNC: 110 MMOL/L
CO2 SERPL-SCNC: 22 MMOL/L
CREAT SERPL-MCNC: 0.8 MG/DL
D DIMER PPP IA.FEU-MCNC: 1.1 MG/L FEU
DIFFERENTIAL METHOD: ABNORMAL
EOSINOPHIL # BLD AUTO: 0.3 K/UL
EOSINOPHIL NFR BLD: 3.3 %
ERYTHROCYTE [DISTWIDTH] IN BLOOD BY AUTOMATED COUNT: 16.1 %
EST. GFR  (AFRICAN AMERICAN): >60 ML/MIN/1.73 M^2
EST. GFR  (NON AFRICAN AMERICAN): >60 ML/MIN/1.73 M^2
GLUCOSE SERPL-MCNC: 103 MG/DL
HCT VFR BLD AUTO: 30.5 %
HGB BLD-MCNC: 9.8 G/DL
INR PPP: 1
LYMPHOCYTES # BLD AUTO: 2.2 K/UL
LYMPHOCYTES NFR BLD: 21.5 %
MCH RBC QN AUTO: 28.6 PG
MCHC RBC AUTO-ENTMCNC: 32.1 %
MCV RBC AUTO: 89 FL
MONOCYTES # BLD AUTO: 0.5 K/UL
MONOCYTES NFR BLD: 4.9 %
NEUTROPHILS # BLD AUTO: 7 K/UL
NEUTROPHILS NFR BLD: 70.2 %
PLATELET # BLD AUTO: 378 K/UL
PMV BLD AUTO: 9.7 FL
POTASSIUM SERPL-SCNC: 4.6 MMOL/L
PROT SERPL-MCNC: 6.5 G/DL
PROTHROMBIN TIME: 11 SEC
RBC # BLD AUTO: 3.43 M/UL
SODIUM SERPL-SCNC: 139 MMOL/L
TROPONIN I SERPL DL<=0.01 NG/ML-MCNC: <0.006 NG/ML
WBC # BLD AUTO: 9.99 K/UL

## 2017-04-15 PROCEDURE — 99285 EMERGENCY DEPT VISIT HI MDM: CPT

## 2017-04-15 PROCEDURE — 85730 THROMBOPLASTIN TIME PARTIAL: CPT

## 2017-04-15 PROCEDURE — 85025 COMPLETE CBC W/AUTO DIFF WBC: CPT

## 2017-04-15 PROCEDURE — 83880 ASSAY OF NATRIURETIC PEPTIDE: CPT

## 2017-04-15 PROCEDURE — 85610 PROTHROMBIN TIME: CPT

## 2017-04-15 PROCEDURE — 84484 ASSAY OF TROPONIN QUANT: CPT

## 2017-04-15 PROCEDURE — 25500020 PHARM REV CODE 255: Performed by: EMERGENCY MEDICINE

## 2017-04-15 PROCEDURE — 25000003 PHARM REV CODE 250: Performed by: EMERGENCY MEDICINE

## 2017-04-15 PROCEDURE — 85379 FIBRIN DEGRADATION QUANT: CPT

## 2017-04-15 PROCEDURE — 80053 COMPREHEN METABOLIC PANEL: CPT

## 2017-04-15 RX ORDER — DIPHENHYDRAMINE HCL 25 MG
25 CAPSULE ORAL
Status: COMPLETED | OUTPATIENT
Start: 2017-04-15 | End: 2017-04-15

## 2017-04-15 RX ORDER — ONDANSETRON 4 MG/1
4 TABLET, ORALLY DISINTEGRATING ORAL
Status: COMPLETED | OUTPATIENT
Start: 2017-04-15 | End: 2017-04-15

## 2017-04-15 RX ORDER — HEPARIN 100 UNIT/ML
500 SYRINGE INTRAVENOUS
Status: COMPLETED | OUTPATIENT
Start: 2017-04-15 | End: 2017-04-15

## 2017-04-15 RX ORDER — KETOROLAC TROMETHAMINE 10 MG/1
10 TABLET, FILM COATED ORAL EVERY 6 HOURS PRN
Qty: 20 TABLET | Refills: 0 | Status: SHIPPED | OUTPATIENT
Start: 2017-04-15

## 2017-04-15 RX ORDER — KETOROLAC TROMETHAMINE 30 MG/ML
30 INJECTION, SOLUTION INTRAMUSCULAR; INTRAVENOUS
Status: DISCONTINUED | OUTPATIENT
Start: 2017-04-15 | End: 2017-04-15

## 2017-04-15 RX ORDER — TRAMADOL HYDROCHLORIDE 50 MG/1
50 TABLET ORAL
Status: COMPLETED | OUTPATIENT
Start: 2017-04-15 | End: 2017-04-15

## 2017-04-15 RX ADMIN — LIDOCAINE HYDROCHLORIDE: 20 SOLUTION ORAL; TOPICAL at 02:04

## 2017-04-15 RX ADMIN — ONDANSETRON 4 MG: 4 TABLET, ORALLY DISINTEGRATING ORAL at 06:04

## 2017-04-15 RX ADMIN — IOHEXOL 70 ML: 350 INJECTION, SOLUTION INTRAVENOUS at 03:04

## 2017-04-15 RX ADMIN — TRAMADOL HYDROCHLORIDE 50 MG: 50 TABLET, FILM COATED ORAL at 06:04

## 2017-04-15 RX ADMIN — Medication 500 UNITS: at 06:04

## 2017-04-15 RX ADMIN — DIPHENHYDRAMINE HYDROCHLORIDE 25 MG: 25 CAPSULE ORAL at 06:04

## 2017-04-15 NOTE — ED AVS SNAPSHOT
[facility]  [address] [place] [address]               Olga Smith   4/15/2017  1:44 AM   ED    Description:  Female : 1945   Department:  [facility]           Your Care was Coordinated By:     Provider Role From To    Rosalva Sorto MD Attending Provider 04/15/17 0147 --      Reason for Visit     Chest Pain           Diagnoses this Visit        Comments    Elevated d-dimer    -  Primary     Chest pain         Abnormal CXR           ED Disposition     ED Disposition Condition Comment    Discharge             To Do List           Follow-up Information     Follow up with Valencia Palencia MD In 3 days.    Specialty:  Family Medicine    Contact information:    [address] [place] [address] [phone]         These Medications        Disp Refills Start End    ketorolac (TORADOL) 10 mg tablet 20 tablet 0 4/15/2017     Take 1 tablet (10 mg total) by mouth every 6 (six) hours as needed for Pain. - Oral    Pharmacy: [facility] [address] - [place] [address] [address] Ph #: [phone]         [facility]     [facility] On Call Nurse Care Line -  Assistance  Unless otherwise directed by your provider, please contact [facility], our nurse care line that is available for  assistance.     Registered nurses in the [facility] provide: appointment scheduling, clinical advisement, health education, and other advisory services.  Call: [phone] (toll free)               Medications           Message regarding Medications     Verify the changes and/or additions to your medication regime listed below are the same as discussed with your clinician today.  If any of these changes or additions are incorrect, please notify your healthcare provider.        START taking these NEW medications        Refills    ketorolac (TORADOL) 10 mg tablet 0    Sig: Take 1 tablet (10 mg total) by mouth every  6 (six) hours as needed for Pain.    Class: Print    Route: Oral      These medications were administered today        Dose Freq    (pyxis) gi cocktail (mylanta 30 mL, lidocaine 2 % viscous 10 mL, dicyclomine 10 mL) 50 mL  ED 1 Time    Sig: Take by mouth ED 1 Time.    Class: Normal    Route: Oral    omnipaque 350 iohexol 70 mL 70 mL IMG once as needed    Sig: Inject 70 mLs into the vein ONCE PRN for contrast.    Class: Normal    Route: Intravenous    ketorolac injection 30 mg 30 mg ED 1 Time    Sig: Inject 30 mg into the vein ED 1 Time.    Class: Normal    Route: Intravenous           Verify that the below list of medications is an accurate representation of the medications you are currently taking.  If none reported, the list may be blank. If incorrect, please contact your healthcare provider. Carry this list with you in case of emergency.           Current Medications     albuterol (PROAIR HFA) 90 mcg/actuation inhaler Inhale 2 puffs into the lungs every 6 (six) hours as needed for Wheezing.    alendronate (FOSAMAX) 70 MG tablet Take 1 tablet (70 mg total) by mouth every 7 days.    aspirin (ECOTRIN) 81 MG EC tablet Take 1 tablet (81 mg total) by mouth once daily.    atorvastatin (LIPITOR) 80 MG tablet Take 1 tablet (80 mg total) by mouth once daily.    baclofen (LIORESAL) 10 MG tablet Take 1 tablet (10 mg total) by mouth 3 (three) times daily. Take half (5mg)  tablet three times a day    bimatoprost (LUMIGAN) 0.01 % Drop Place 1 drop into both eyes every evening.    calcium carbonate-vitamin D3 (CALTRATE 600 + D) 600 mg (1,500 mg)-800 unit Chew Take 2 tablets by mouth 2 (two) times daily. Pt. Takes= 1.5 tabs every AM & 1 TAB Every evening.    colesevelam (WELCHOL) 625 mg tablet Take 3 tablets (1,875 mg total) by mouth every evening.    cyclobenzaprine (FLEXERIL) 5 MG tablet Take 1 tablet (5 mg total) by mouth 3 (three) times daily as needed for Muscle spasms.    dicyclomine (BENTYL) 10 MG capsule Take 1 capsule  "(10 mg total) by mouth 4 (four) times daily before meals and nightly.    diphenhydrAMINE (BENADRYL) 25 mg capsule Take 1 each (25 mg total) by mouth nightly as needed for Itching.    fentaNYL (DURAGESIC) 25 mcg/hr Place 1 patch onto the skin every 72 hours.    hydrocortisone (CORTEF) 10 MG Tab Take 1 tablet (10 mg total) by mouth 2 (two) times daily.    isosorbide mononitrate (IMDUR) 30 MG 24 hr tablet Take 1 tablet (30 mg total) by mouth once daily.    ketorolac (TORADOL) 10 mg tablet Take 1 tablet (10 mg total) by mouth every 6 (six) hours as needed for Pain.    ketorolac injection 30 mg Inject 30 mg into the vein ED 1 Time.    levothyroxine (SYNTHROID) 112 MCG tablet Take 1 tablet (112 mcg total) by mouth once daily.    lidocaine (XYLOCAINE) 5 % Oint ointment Apply topically as needed.    metoprolol tartrate (LOPRESSOR) 50 MG tablet Take 1 tablet (50 mg total) by mouth 2 (two) times daily.    montelukast (SINGULAIR) 10 mg tablet Take 1 tablet (10 mg total) by mouth every evening.    nitroGLYCERIN (NITROSTAT) 0.4 MG SL tablet Place 1 tablet (0.4 mg total) under the tongue every 5 (five) minutes as needed for Chest pain.    potassium chloride (KLOR-CON) 10 MEQ TbSR Take 1 tablet (10 mEq total) by mouth once daily.    prasugrel (EFFIENT) 10 mg Tab Take 1 tablet (10 mg total) by mouth once daily.    promethazine (PHENERGAN) 25 MG tablet Take 1 tablet (25 mg total) by mouth every 6 (six) hours as needed for Nausea.    RANEXA 500 mg Tb12     ranolazine (RANEXA) 1,000 mg Tb12 Take 1 tablet (1,000 mg total) by mouth 2 (two) times daily.    sertraline (ZOLOFT) 50 MG tablet Take 1 tablet (50 mg total) by mouth once daily.           Clinical Reference Information           Your Vitals Were     BP Pulse Temp Resp Height Weight    139/68 66 97.8 °F (36.6 °C) (Oral) 18 5' 3" (1.6 m) 76.7 kg (169 lb)    SpO2 BMI             99% 29.94 kg/m2         Allergies as of 4/15/2017        Reactions    Aspirin Nausea Only    Dilaudid " [Hydromorphone (Bulk)] Itching    Rash , swelling of throat.    Iodine And Iodide Containing Products Other (See Comments)    Closing of Throat. No Shellfish of any kind. Can't eat certain kinds of Regular Fish, ie, can't eat: Tuna, Swordfish, [place]=} these are the major fishes she can't eat.    Morphine Itching, Nausea And Vomiting, Hallucinations    Oxycodone Itching, Swelling    Swelling of Throat.    Penicillins Itching, Rash    Percocet [Oxycodone-acetaminophen] Itching, Swelling    Cannot tolerate this medication. Swelling of Throat.    Shellfish Containing Products Other (See Comments)    Closing of Throat. No Shellfish of any kind. Can't eat certain kinds of Regular Fish, ie, can't eat: Tuna, Swordfish, [place]=} these are the major fishes she can't eat.    Valium [Diazepam] Hives, Itching    Vicodin [Hydrocodone-acetaminophen] Itching, Nausea Only, Rash    Codeine Itching, Nausea Only, Rash    Demerol [Meperidine] Itching, Rash      Immunizations Administered on Date of Encounter - 4/15/2017     None      ED Micro, Lab, POCT     Start Ordered       Status Ordering Provider    04/15/17 0205 04/15/17 0204  D dimer, quantitative  STAT      Final result     04/15/17 0205 04/15/17 0204  Protime-INR  STAT      Final result     04/15/17 0205 04/15/17 0204  APTT  STAT      Final result     04/15/17 0154 04/15/17 0153  Comprehensive metabolic panel  STAT      Final result     04/15/17 0154 04/15/17 0153  CBC auto differential  STAT      Final result     04/15/17 0154 04/15/17 0153  Brain natriuretic peptide  STAT      Final result     04/15/17 0154 04/15/17 0153  Troponin I  STAT      Final result       ED Imaging Orders     Start Ordered       Status Ordering Provider    04/15/17 0306 04/15/17 0305  X-Ray Abdomen Flat And Erect  1 time imaging      Acknowledged     04/15/17 0306 04/15/17 0305  CTA Chest Non-Coronary (PE Study)  1 time imaging      Final result     04/15/17 0155 04/15/17 0154  X-Ray Chest 1 View  1  time imaging      Final result         Discharge Instructions         Uncertain Causes of Chest Pain    Chest pain can happen for a number of reasons. Sometimes the cause can't be determined. If your condition does not seem serious, and your pain does not appear to be coming from your heart, your healthcare provider may recommend watching it closely. Sometimes the signs of a serious problem take more time to appear. Many problems not related to your heart can cause chest pain.These include:  · Musculoskeletal. Costochondritis, an inflammation of the tissues around the ribs that can occur from trauma or overuse injuries  · Respiratory. Pneumonia, pneumothorax, or pneumonitis (inflammation of the lining of the chest and lungs)  · Gastrointestinal. Esophageal reflux, heartburn, or gallbladder disease  · Anxiety and panic disorders  · Nerve compression and neuritis  · Miscellaneous problems such as aortic aneurysm or pulmonary embolism (a blood clot in the lungs)  Home care  After your visit, follow these recommendations:  · Rest today and avoid strenuous activity.  · Take any prescribed medicine as directed.  · Be aware of any recurrent chest pain and notice any changes  Follow-up care  Follow up with your healthcare provider if you do not start to feel better within 24 hours, or as advised.  Call 911  Call 911 if any of these occur:  · A change in the type of pain: if it feels different, becomes more severe, lasts longer, or begins to spread into your shoulder, arm, neck, jaw or back  · Shortness of breath or increased pain with breathing  · Weakness, dizziness, or fainting  · Rapid heart beat  · Crushing sensation in your chest  When to seek medical advice  Call your healthcare provider right away if any of the following occur:  · Cough with dark colored sputum (phlegm) or blood  · Fever of 100.4ºF (38ºC) or higher, or as directed by your healthcare provider  · Swelling, pain or redness in one leg  · Shortness of  breath  Date Last Reviewed: 12/30/2015  © 5230-1730 [email or web address]. [address] [place] [address], [place] [address]. All rights reserved. This information is not intended as a substitute for professional medical care. Always follow your healthcare professional's instructions.          Your Scheduled Appointments     May 16, 2017 10:15 AM CDT   [facility] with PERIMETRY, ASHLYN Rae - Ophthalmology ([facility] )    [address]  [place] [address] [phone]            May 16, 2017 10:45 AM CDT   Established Patient Visit with MD Gonzalo Spicer - Ophthalmology ([facility] )    [address]  [place] [address] [phone]            May 24, 2017 10:30 AM CDT   Neurology - Established Patient with MD Gonzalo Malhotra II - Neurology ([facility] )    [address]  [place] [address] [phone]              Smoking Cessation     If you would like to quit smoking:   You may be eligible for free services if you are a [place] resident and started smoking cigarettes before September 1, 1988.  Call the Smoking Cessation Trust (SCT) toll free at (650) 088-3420 or (354) 011-0262.   Call 1-800-QUIT-NOW if you do not meet the above criteria.   Contact us via email: [email or web address]   View our website for more information: [email or web address]         [facility] complies with applicable Federal civil rights laws and does not discriminate on the basis of race, color, national origin, age, disability, or sex.        Language Assistance Services     ATTENTION: Language assistance services are available, free of charge. Please call [phone].      ATENCIÓN: Si habla español, tiene a avilez disposición servicios gratuitos de asistencia lingüística. Llame al [phone].     CHÚ Ý: N?u b?n nói Ti?ng Vi?t, có các d?ch v? h? tr? ngôn ng? mi?n phí dành cho b?n. G?i s?  [phone].

## 2017-04-15 NOTE — ED PROVIDER NOTES
Encounter Date: 4/15/2017    SCRIBE #1 NOTE: I, Ary Street, am scribing for, and in the presence of,  Rosalva Sorto. I have scribed the following portions of the note - Other sections scribed: HPI/ROS/PE.       History     Chief Complaint   Patient presents with    Chest Pain     Pt reports midsternal CP since 9 PM.  EMS states pt took 2 SL Nitro prior to their arrival.  Pt is CEC from [place].  Sitter present.      Review of patient's allergies indicates:   Allergen Reactions    Aspirin Nausea Only    Dilaudid [hydromorphone (bulk)] Itching     Rash , swelling of throat.    Iodine and iodide containing products Other (See Comments)     Closing of Throat. No Shellfish of any kind. Can't eat certain kinds of Regular Fish, ie, can't eat: Tuna, Swordfish, [place]=} these are the major fishes she can't eat.    Morphine Itching, Nausea And Vomiting and Hallucinations    Oxycodone Itching and Swelling     Swelling of Throat.    Penicillins Itching and Rash    Percocet [oxycodone-acetaminophen] Itching and Swelling     Cannot tolerate this medication. Swelling of Throat.    Shellfish containing products Other (See Comments)     Closing of Throat. No Shellfish of any kind. Can't eat certain kinds of Regular Fish, ie, can't eat: Tuna, Swordfish, [place]=} these are the major fishes she can't eat.      Valium [diazepam] Hives and Itching    Vicodin [hydrocodone-acetaminophen] Itching, Nausea Only and Rash    Codeine Itching, Nausea Only and Rash    Demerol [meperidine] Itching and Rash     HPI Comments: CC: Chest Pain    HPI: 72 y.o. F presents to the ED c/o acute onset of sharp, moderate, non-radiating mid-sternal CP beginning about 12 hours PTA. Prior to the pain, pt began feeling chills. Pt states pain waxes and wanes. Pt took 2 SL nitro prior to EMS arrival with no relief. Pt otherwise denies diaphoresis, cough, N/V/D, and any other symptoms. Pt is CEC from [facility] (admitted 4/4). Sitter is present. When  pt is not at [facility], pt lives at home with spouse.    PMHx: Bipolar disorder, IBS, COPD, HTN, fibromyalgia, CAD, thyroid disease, MI, craniopharyngioma    The history is provided by the patient and a caregiver.     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    Encounter for blood transfusion     Extravasation injury of IV catheter site with other complication 2008    Pain remains to left forearm, no IV sticks    Fibromyalgia     Glaucoma     Hypertension     Irritable bowel syndrome (IBS)     MI (myocardial infarction)     Osteoporosis 8/7/2015    Pacemaker     Thyroid disease      Past Surgical History:   Procedure Laterality Date    ABDOMINAL SURGERY      CARDIAC SURGERY      stents and pacemaker    COLONOSCOPY N/A 9/19/2016    Procedure: COLONOSCOPY;  Surgeon: Lisandro Kaba MD;  Location: [facility];  Service: Endoscopy;  Laterality: N/A;  [facility], HAS TO COME HERE    HERNIA REPAIR      HYSTERECTOMY      SMALL INTESTINE SURGERY      TRACHEOSTOMY TUBE PLACEMENT      pt kept x 8.5 years. Removed during 2009     Family History   Problem Relation Age of Onset    Cataracts Brother     Glaucoma Brother     Glaucoma Mother     Lung cancer Mother     Coronary artery disease Mother     Coronary artery disease Father     Cataracts Sister     Glaucoma Sister     No Known Problems Maternal Aunt     No Known Problems Maternal Uncle     No Known Problems Paternal Aunt     No Known Problems Paternal Uncle     No Known Problems Maternal Grandmother     No Known Problems Maternal Grandfather     No Known Problems Paternal Grandmother     No Known Problems Paternal Grandfather     Colon cancer Neg Hx     Colon polyps Neg Hx     Amblyopia Neg Hx     Blindness Neg Hx     Cancer Neg Hx     Diabetes Neg Hx      Hypertension Neg Hx     Macular degeneration Neg Hx     Retinal detachment Neg Hx     Strabismus Neg Hx     Stroke Neg Hx     Thyroid disease Neg Hx      Social History   Substance Use Topics    Smoking status: Former Smoker     Packs/day: 1.00     Years: 25.00     Types: Cigarettes     Quit date: 11/9/1985    Smokeless tobacco: Never Used    Alcohol use No     Review of Systems   Constitutional: Positive for chills. Negative for diaphoresis.   HENT: Negative for congestion and sore throat.    Eyes: Negative for pain.   Respiratory: Negative for shortness of breath.    Cardiovascular: Positive for chest pain.   Gastrointestinal: Negative for diarrhea, nausea and vomiting.   Genitourinary: Negative for dysuria.   Musculoskeletal: Negative for neck pain.   Skin: Negative for rash.   Neurological: Negative for headaches.       Physical Exam   Initial Vitals   BP Pulse Resp Temp SpO2   04/15/17 0143 04/15/17 0143 04/15/17 0143 04/15/17 0143 04/15/17 0143   124/68 63 18 97.8 °F (36.6 °C) 99 %     Physical Exam    Nursing note and vitals reviewed.  Constitutional: She appears well-developed and well-nourished. She is not diaphoretic. She is active.   Pt is moaning with pain. She is able speak in complete sentences.   HENT:   Head: Normocephalic and atraumatic.   Mouth/Throat: Oropharynx is clear and moist.   Eyes: Conjunctivae and EOM are normal. Pupils are equal, round, and reactive to light.   Neck: Normal range of motion. Neck supple.   Cardiovascular: Normal rate, regular rhythm and normal heart sounds.   No murmur heard.  Pulmonary/Chest: Breath sounds normal. No respiratory distress. She has no wheezes. She has no rhonchi. She has no rales.   Abdominal: Soft. Bowel sounds are normal. She exhibits no distension. There is no tenderness.   Musculoskeletal: Normal range of motion. She exhibits no edema or tenderness.   Neurological: She is alert and oriented to person, place, and time. She has normal strength.    Skin: Skin is warm and dry. No rash noted.   Psychiatric: Her speech is normal.         ED Course   Procedures  Labs Reviewed   COMPREHENSIVE METABOLIC PANEL   CBC W/ AUTO DIFFERENTIAL   B-TYPE NATRIURETIC PEPTIDE   TROPONIN I     EKG Readings: (Independently Interpreted)   EMS 01:12: NSR, HR 66. Normal axis and intervals. No STEMI.  ED 01:55: NSR, HR 65. Normal axis and intervals. No STEMI.       X-Rays:   Independently Interpreted Readings:   Other Readings:  CXR NAD    Medical Decision Making:   History:   Old Medical Records: I decided to obtain old medical records.  Old Records Summarized: records from previous admission(s).  Initial Assessment:   72 y.o. Female here from [facility] for CP. Patient has been at [facility] since 4/4/17, approximately 11 days ago. Prior to that, she resided with her . [facility] notes suggest that he is also in poor health. Patient has a history of bipolar disorder and fibromyalgia. She has been seen at this ED multiple times for CP in the recent past. This is her fourth visit for CP since the beginning of this year.  Differential Diagnosis:   Ddx includes ACS, CHF, dissection, PE, chest wall pain, GERD, other.  Independently Interpreted Test(s):   I have ordered and independently interpreted X-rays - see prior notes.  I have ordered and independently interpreted EKG Reading(s) - see prior notes  Clinical Tests:   Lab Tests: Ordered and Reviewed  Radiological Study: Ordered and Reviewed  Medical Tests: Ordered and Reviewed  ED Management:  Patient had GI cocktail which did not relieve her pain. She stated she only had relief with tramadol with benadryl and zofran, which was given. Her EKG is stable. Her CXR is stable. Labs showed a negative troponin. Because of frequent ED visits for CP in this patient who is a poor historian but who has an alleged history of DVT, I also checked a ddimer, which was elevated. CTA chest shows no PE and no dissection. I suspect  patient's CP is related to her fibromyalgia/comorbid psychiatric condition rather than organic pathology. D/c'ed back to [place] with sitter in [place].            Scribe Attestation:   Scribe #1: I performed the above scribed service and the documentation accurately describes the services I performed. I attest to the accuracy of the note.    Attending Attestation:           Physician Attestation for Scribe:  Physician Attestation Statement for Scribe #1: I, Rosalva Sorto MD, reviewed documentation, as scribed by Ary Street in my presence, and it is both accurate and complete.                 ED Course     Clinical Impression:   The primary encounter diagnosis was Elevated d-dimer. Diagnoses of Chest pain and Abnormal CXR were also pertinent to this visit.          Rosalva Sorto MD  04/15/17 2074

## 2017-04-15 NOTE — DISCHARGE INSTRUCTIONS

## 2017-04-15 NOTE — ED TRIAGE NOTES
Pt. Presents to ED with c/o mid sternal chest pain that began tonight, denies NVD, rates pain 8/10.

## 2017-04-20 ENCOUNTER — HOSPITAL ENCOUNTER (EMERGENCY)
Facility: HOSPITAL | Age: 72
End: 2017-04-21
Attending: EMERGENCY MEDICINE
Payer: MEDICARE

## 2017-04-20 DIAGNOSIS — G89.29 CHRONIC ABDOMINAL PAIN: ICD-10-CM

## 2017-04-20 DIAGNOSIS — R10.9 CHRONIC ABDOMINAL PAIN: ICD-10-CM

## 2017-04-20 DIAGNOSIS — R11.2 NAUSEA VOMITING AND DIARRHEA: Primary | ICD-10-CM

## 2017-04-20 DIAGNOSIS — R19.7 NAUSEA VOMITING AND DIARRHEA: Primary | ICD-10-CM

## 2017-04-20 DIAGNOSIS — S16.1XXA CERVICAL STRAIN, ACUTE, INITIAL ENCOUNTER: ICD-10-CM

## 2017-04-20 LAB
BASOPHILS # BLD AUTO: 0.01 K/UL
BASOPHILS NFR BLD: 0.1 %
DIFFERENTIAL METHOD: ABNORMAL
EOSINOPHIL # BLD AUTO: 0.3 K/UL
EOSINOPHIL NFR BLD: 3 %
ERYTHROCYTE [DISTWIDTH] IN BLOOD BY AUTOMATED COUNT: 16.3 %
HCT VFR BLD AUTO: 33 %
HGB BLD-MCNC: 10.7 G/DL
LYMPHOCYTES # BLD AUTO: 2.3 K/UL
LYMPHOCYTES NFR BLD: 21.4 %
MCH RBC QN AUTO: 28.7 PG
MCHC RBC AUTO-ENTMCNC: 32.4 %
MCV RBC AUTO: 89 FL
MONOCYTES # BLD AUTO: 0.9 K/UL
MONOCYTES NFR BLD: 8.1 %
NEUTROPHILS # BLD AUTO: 7.4 K/UL
NEUTROPHILS NFR BLD: 67.4 %
PLATELET # BLD AUTO: 370 K/UL
PMV BLD AUTO: 9.5 FL
RBC # BLD AUTO: 3.73 M/UL
WBC # BLD AUTO: 10.93 K/UL

## 2017-04-20 PROCEDURE — 99285 EMERGENCY DEPT VISIT HI MDM: CPT | Mod: 25

## 2017-04-20 PROCEDURE — 63600175 PHARM REV CODE 636 W HCPCS: Performed by: EMERGENCY MEDICINE

## 2017-04-20 PROCEDURE — 83690 ASSAY OF LIPASE: CPT

## 2017-04-20 PROCEDURE — 25000003 PHARM REV CODE 250: Performed by: EMERGENCY MEDICINE

## 2017-04-20 PROCEDURE — 85025 COMPLETE CBC W/AUTO DIFF WBC: CPT

## 2017-04-20 PROCEDURE — 80053 COMPREHEN METABOLIC PANEL: CPT

## 2017-04-20 PROCEDURE — 96361 HYDRATE IV INFUSION ADD-ON: CPT

## 2017-04-20 PROCEDURE — 96374 THER/PROPH/DIAG INJ IV PUSH: CPT

## 2017-04-20 RX ORDER — ONDANSETRON 2 MG/ML
4 INJECTION INTRAMUSCULAR; INTRAVENOUS
Status: COMPLETED | OUTPATIENT
Start: 2017-04-20 | End: 2017-04-20

## 2017-04-20 RX ORDER — DIPHENOXYLATE HYDROCHLORIDE AND ATROPINE SULFATE 2.5; .025 MG/1; MG/1
1 TABLET ORAL
Status: COMPLETED | OUTPATIENT
Start: 2017-04-20 | End: 2017-04-20

## 2017-04-20 RX ADMIN — DIPHENOXYLATE HYDROCHLORIDE AND ATROPINE SULFATE 1 TABLET: 2.5; .025 TABLET ORAL at 11:04

## 2017-04-20 RX ADMIN — SODIUM CHLORIDE 1000 ML: 0.9 INJECTION, SOLUTION INTRAVENOUS at 11:04

## 2017-04-20 RX ADMIN — ONDANSETRON 4 MG: 2 INJECTION INTRAMUSCULAR; INTRAVENOUS at 11:04

## 2017-04-20 NOTE — ED AVS SNAPSHOT
[facility]  [address] [place] [address]               Olga Smith   2017  9:31 PM   ED    Description:  Female : 1945   Department:  [facility]           Your Care was Coordinated By:     Provider Role From To    Chintan Gomez MD Attending Provider [phone] --      Reason for Visit     Abdominal Pain     Fall           Diagnoses this Visit        Comments    Nausea vomiting and diarrhea    -  Primary     Cervical strain, acute, initial encounter         Chronic abdominal pain           ED Disposition     None           To Do List           Follow-up Information     Follow up with Valencia Palencia MD. Schedule an appointment as soon as possible for a visit in 3 days.    Specialty:  Family Medicine    Why:  As needed    Contact information:    [address]  [place] [address] [phone]        [facility]     [facility] -  Assistance  Unless otherwise directed by your provider, please contact [facility], our nurse care line that is available for  assistance.     Registered nurses in the [facility] provide: appointment scheduling, clinical advisement, health education, and other advisory services.  Call: [phone] (toll free)               Medications           Message regarding Medications     Verify the changes and/or additions to your medication regime listed below are the same as discussed with your clinician today.  If any of these changes or additions are incorrect, please notify your healthcare provider.        These medications were administered today        Dose Freq    sodium chloride 0.9% bolus 1,000 mL 1,000 mL Once    Sig: Inject 1,000 mLs into the vein once.    Class: Normal    Route: Intravenous    ondansetron injection 4 mg 4 mg ED 1 Time    Sig: Inject 4 mg into the vein ED 1 Time.    Class: Normal    Route: Intravenous    diphenoxylate-atropine  2.5-0.025 mg per tablet 1 tablet 1 tablet ED 1 Time    Sig: Take 1 tablet by mouth ED 1 Time.    Class: Normal    Route: Oral           Verify that the below list of medications is an accurate representation of the medications you are currently taking.  If none reported, the list may be blank. If incorrect, please contact your healthcare provider. Carry this list with you in case of emergency.           Current Medications     albuterol (PROAIR HFA) 90 mcg/actuation inhaler Inhale 2 puffs into the lungs every 6 (six) hours as needed for Wheezing.    alendronate (FOSAMAX) 70 MG tablet Take 1 tablet (70 mg total) by mouth every 7 days.    aspirin (ECOTRIN) 81 MG EC tablet Take 1 tablet (81 mg total) by mouth once daily.    atorvastatin (LIPITOR) 80 MG tablet Take 1 tablet (80 mg total) by mouth once daily.    baclofen (LIORESAL) 10 MG tablet Take 1 tablet (10 mg total) by mouth 3 (three) times daily. Take half (5mg)  tablet three times a day    bimatoprost (LUMIGAN) 0.01 % Drop Place 1 drop into both eyes every evening.    calcium carbonate-vitamin D3 (CALTRATE 600 + D) 600 mg (1,500 mg)-800 unit Chew Take 2 tablets by mouth 2 (two) times daily. Pt. Takes= 1.5 tabs every AM & 1 TAB Every evening.    colesevelam (WELCHOL) 625 mg tablet Take 3 tablets (1,875 mg total) by mouth every evening.    cyclobenzaprine (FLEXERIL) 5 MG tablet Take 1 tablet (5 mg total) by mouth 3 (three) times daily as needed for Muscle spasms.    dicyclomine (BENTYL) 10 MG capsule Take 1 capsule (10 mg total) by mouth 4 (four) times daily before meals and nightly.    diphenhydrAMINE (BENADRYL) 25 mg capsule Take 1 each (25 mg total) by mouth nightly as needed for Itching.    fentaNYL (DURAGESIC) 25 mcg/hr Place 1 patch onto the skin every 72 hours.    hydrocortisone (CORTEF) 10 MG Tab Take 1 tablet (10 mg total) by mouth 2 (two) times daily.    isosorbide mononitrate (IMDUR) 30 MG 24 hr tablet Take 1 tablet (30 mg total) by mouth once daily.     "ketorolac (TORADOL) 10 mg tablet Take 1 tablet (10 mg total) by mouth every 6 (six) hours as needed for Pain.    levothyroxine (SYNTHROID) 112 MCG tablet Take 1 tablet (112 mcg total) by mouth once daily.    lidocaine (XYLOCAINE) 5 % Oint ointment Apply topically as needed.    metoprolol tartrate (LOPRESSOR) 50 MG tablet Take 1 tablet (50 mg total) by mouth 2 (two) times daily.    montelukast (SINGULAIR) 10 mg tablet Take 1 tablet (10 mg total) by mouth every evening.    nitroGLYCERIN (NITROSTAT) 0.4 MG SL tablet Place 1 tablet (0.4 mg total) under the tongue every 5 (five) minutes as needed for Chest pain.    potassium chloride (KLOR-CON) 10 MEQ TbSR Take 1 tablet (10 mEq total) by mouth once daily.    prasugrel (EFFIENT) 10 mg Tab Take 1 tablet (10 mg total) by mouth once daily.    promethazine (PHENERGAN) 25 MG tablet Take 1 tablet (25 mg total) by mouth every 6 (six) hours as needed for Nausea.    RANEXA 500 mg Tb12     ranolazine (RANEXA) 1,000 mg Tb12 Take 1 tablet (1,000 mg total) by mouth 2 (two) times daily.    sertraline (ZOLOFT) 50 MG tablet Take 1 tablet (50 mg total) by mouth once daily.           Clinical Reference Information           Your Vitals Were     BP Pulse Temp Resp Height Weight    169/91 (BP Location: Left arm, Patient Position: Sitting, BP Method: Automatic) 64 98.5 °F (36.9 °C) (Oral) 18 5' 3" (1.6 m) 76.7 kg (169 lb)    SpO2 BMI             100% 29.94 kg/m2         Allergies as of 4/21/2017        Reactions    Aspirin Nausea Only    Dilaudid [Hydromorphone (Bulk)] Itching    Rash , swelling of throat.    Iodine And Iodide Containing Products Other (See Comments)    Closing of Throat. No Shellfish of any kind. Can't eat certain kinds of Regular Fish, ie, can't eat: Tuna, Swordfish, [place]=} these are the major fishes she can't eat.    Morphine Itching, Nausea And Vomiting, Hallucinations    Oxycodone Itching, Swelling    Swelling of Throat.    Penicillins Itching, Rash    Percocet " [Oxycodone-acetaminophen] Itching, Swelling    Cannot tolerate this medication. Swelling of Throat.    Shellfish Containing Products Other (See Comments)    Closing of Throat. No Shellfish of any kind. Can't eat certain kinds of Regular Fish, ie, can't eat: Tuna, Swordfish, [place]=} these are the major fishes she can't eat.    Valium [Diazepam] Hives, Itching    Vicodin [Hydrocodone-acetaminophen] Itching, Nausea Only, Rash    Codeine Itching, Nausea Only, Rash    Demerol [Meperidine] Itching, Rash      Immunizations Administered on Date of Encounter - 4/21/2017     None      ED Micro, Lab, POCT     Start Ordered       Status Ordering Provider    04/20/17 2139 04/20/17 2138  CBC W/ AUTO DIFFERENTIAL  Once      Final result     04/20/17 2139 04/20/17 2138  Comp. Metabolic Panel  STAT      Final result     04/20/17 2139 04/20/17 2138  Lipase  STAT      Final result     04/20/17 2139 04/20/17 2138  Urinalysis - Clean Catch  STAT      Acknowledged       ED Imaging Orders     Start Ordered       Status Ordering Provider    04/20/17 2139 04/20/17 2138  CT Abdomen Pelvis  Without Contrast  1 time imaging      Final result     04/20/17 2139 04/20/17 2138  CT Cervical Spine Without Contrast  1 time imaging      Final result         Discharge Instructions         Abdominal Pain    Abdominal pain is pain in the stomach or belly area. Everyone has this pain from time to time. In many cases it goes away on its own. But abdominal pain can sometimes be due to a serious problem, such as appendicitis. So its important to know when to seek help.  Causes of abdominal pain  There are many possible causes of abdominal pain. Common causes in adults include:  · Constipation, diarrhea, or gas  · Stomach acid flowing back up into the esophagus (acid reflux or heartburn)  · Severe acid reflux, called GERD (gastroesophageal reflux disease)  · A sore in the lining of the stomach or small intestine (peptic ulcer)  · Inflammation of the  gallbladder, liver, or pancreas  · Gallstones or kidney stones  · Appendicitis   · Intestinal blockage   · An internal organ pushing through a muscle or other tissue (hernia)  · Urinary tract infections  · In women, menstrual cramps, fibroids, or endometriosis  · Inflammation or infection of the intestines  Diagnosing the cause of abdominal pain  Your healthcare provider will do a physical exam help find the cause of your pain. If needed, tests will be ordered. Belly pain has many possible causes. So it can be hard to find the reason for your pain. Giving details about your pain can help. Tell your provider where and when you feel the pain, and what makes it better or worse. Also let your provider know if you have other symptoms such as:  · Fever  · Tiredness  · Upset stomach (nausea)  · Vomiting  · Changes in bathroom habits  Treating abdominal pain  Some causes of pain need emergency medical treatment right away. These include appendicitis or a bowel blockage. Other problems can be treated with rest, fluids, or medicines. Your healthcare provider can give you specific instructions for treatment or self-care based on what is causing your pain.  If you have vomiting or diarrhea, sip water or other clear fluids. When you are ready to eat solid foods again, start with small amounts of easy-to-digest, low-fat foods. These include apple sauce, toast, or crackers.   When to seek medical care  Call 911 or go to the hospital right away if you:  · Cant pass stool and are vomiting  · Are vomiting blood or have bloody diarrhea or black, tarry diarrhea  · Have chest, neck, or shoulder pain  · Feel like you might pass out  · Have pain in your shoulder blades with nausea  · Have sudden, severe belly pain  · Have new, severe pain unlike any you have felt before  · Have a belly that is rigid, hard, and tender to touch  Call your healthcare provider if you have:  · Pain for more than 5 days  · Bloating for more than  2 days  · Diarrhea for more than 5 days  · A fever of 100.4°F (38.0°C) or higher, or as directed by your provider  · Pain that gets worse  · Weight loss for no reason  · Continued lack of appetite  · Blood in your stool  How to prevent abdominal pain  Here are some tips to help prevent abdominal pain:  · Eat smaller amounts of food at one time.  · Avoid greasy, fried, or other high-fat foods.  · Avoid foods that give you gas.  · Exercise regularly.  · Drink plenty of fluids.  To help prevent GERD symptoms:  · Quit smoking.  · Reduce alcohol and certain foods that increase stomach acid.  · Avoid aspirin and over-the-counter pain and fever medicines (NSAIDS or nonsteroidal anti-inflammatory drugs), if possible  · Lose extra weight.  · Finish eating at least 2 hours before you go to bed or lie down.  · Raise the head of your bed.  Date Last Reviewed: 7/1/2016  © 3060-5624 [employer]. [address] [place] [address]. All rights reserved. This information is not intended as a substitute for professional medical care. Always follow your healthcare professional's instructions.          Treating Diarrhea    Diarrhea happens when you have loose, watery, or frequent bowel movements. It is a common problem with many causes. Most cases of diarrhea clear up on their own. But certain cases may need treatment. Be sure to see your healthcare provider if your symptoms do not improve within a few days.  Getting relief  Treatment of diarrhea depends on its cause. Diarrhea caused by bacterial or parasite infection is often treated with antibiotics. Diarrhea caused by other factors, such as a stomach virus, often improves with simple home treatment. The tips below may also help relieve your symptoms.  · Drink plenty of fluids. This helps prevent too much fluid loss (dehydration). Water, clear soups, and electrolyte solutions are good choices. Avoid alcohol, coffee, tea, and milk. These can irritate your  intestines and make symptoms worse.  · Suck on ice chips if drinking makes you queasy.  · Return to your normal diet slowly. You may want to eat bland foods at first, such as rice and toast. Also, you may need to avoid certain foods for a while, such as dairy products. These can make symptoms worse. Ask your healthcare provider if there are any other foods you should avoid.  · If you were prescribed antibiotics, take them as directed.  · Do not take anti-diarrhea medicines without asking your healthcare provider first.  Call your healthcare provider   Call your healthcare provider if you have any of the following:   · A fever of 100.4°F (38.0°C) or higher, or as directed by your healthcare provider  · Severe pain  · Worsening diarrhea or diarrhea for more than 2 days  · Bloody vomit or stool  · Signs of dehydration (dizziness, dry mouth and tongue, rapid pulse, dark urine)  Date Last Reviewed: 7/1/2016 © 2000-2016 [employer]. [address] [place] [address]. All rights reserved. This information is not intended as a substitute for professional medical care. Always follow your healthcare professional's instructions.          Your Scheduled Appointments     May 16, 2017 10:15 AM CDT   [facility] with PERIMETRY, ASHLYN Rae - Ophthalmology ([facility] )    [address]  [place] [address] [phone]            May 16, 2017 10:45 AM CDT   Established Patient Visit with MD Gonzalo Spicer - Ophthalmology ([facility] )    [address]  [place] [id number] [phone]            May 24, 2017 10:30 AM CDT   Neurology - Established Patient with MD Gonzalo Malhotra II - Neurology ([facility] )    [address]  [place] [id number] [phone]              Smoking Cessation     If you would like to quit smoking:   You may be eligible for free services if you are a [place]  resident and started smoking cigarettes before September 1, 1988.  Call the Smoking Cessation Trust (SCT) toll free at (613) 790-0463 or (546) 019-4281.   Call 1-800-QUIT-NOW if you do not meet the above criteria.   Contact us via email: [email or web address]   View our website for more information: [email or web address]         [facility] complies with applicable Federal civil rights laws and does not discriminate on the basis of race, color, national origin, age, disability, or sex.        Language Assistance Services     ATTENTION: Language assistance services are available, free of charge. Please call [phone].      ATENCIÓN: Si habla español, tiene a avilez disposición servicios gratuitos de asistencia lingüística. Llame al [phone].     CHÚ Ý: N?u b?n nói Ti?ng Vi?t, có các d?ch v? h? tr? ngôn ng? mi?n phí dành cho b?n. G?i s? [phone].

## 2017-04-21 VITALS
HEART RATE: 64 BPM | BODY MASS INDEX: 29.95 KG/M2 | OXYGEN SATURATION: 99 % | TEMPERATURE: 98 F | WEIGHT: 169 LBS | DIASTOLIC BLOOD PRESSURE: 74 MMHG | RESPIRATION RATE: 20 BRPM | SYSTOLIC BLOOD PRESSURE: 145 MMHG | HEIGHT: 63 IN

## 2017-04-21 LAB
ALBUMIN SERPL BCP-MCNC: 3.7 G/DL
ALP SERPL-CCNC: 56 U/L
ALT SERPL W/O P-5'-P-CCNC: 12 U/L
ANION GAP SERPL CALC-SCNC: 6 MMOL/L
AST SERPL-CCNC: 14 U/L
BILIRUB SERPL-MCNC: 0.3 MG/DL
BUN SERPL-MCNC: 11 MG/DL
CALCIUM SERPL-MCNC: 10.2 MG/DL
CHLORIDE SERPL-SCNC: 104 MMOL/L
CO2 SERPL-SCNC: 28 MMOL/L
CREAT SERPL-MCNC: 0.9 MG/DL
EST. GFR  (AFRICAN AMERICAN): >60 ML/MIN/1.73 M^2
EST. GFR  (NON AFRICAN AMERICAN): >60 ML/MIN/1.73 M^2
GLUCOSE SERPL-MCNC: 85 MG/DL
LIPASE SERPL-CCNC: 41 U/L
POTASSIUM SERPL-SCNC: 4.1 MMOL/L
PROT SERPL-MCNC: 6.8 G/DL
SODIUM SERPL-SCNC: 138 MMOL/L

## 2017-04-21 PROCEDURE — 25000003 PHARM REV CODE 250: Performed by: EMERGENCY MEDICINE

## 2017-04-21 PROCEDURE — 96375 TX/PRO/DX INJ NEW DRUG ADDON: CPT

## 2017-04-21 RX ORDER — HEPARIN 100 UNIT/ML
500 SYRINGE INTRAVENOUS
Status: COMPLETED | OUTPATIENT
Start: 2017-04-21 | End: 2017-04-21

## 2017-04-21 RX ADMIN — Medication 500 UNITS: at 02:04

## 2017-04-21 NOTE — ED NOTES
Right chest portacath accessed using sterile technique and biiopatch and sterile dressing in place. Good blood return and flushes easily. IVF started.

## 2017-04-21 NOTE — ED PROVIDER NOTES
Encounter Date: 4/20/2017    SCRIBE #1 NOTE: I, Sreedhar Sheets, am scribing for, and in the presence of, Chintan Gomez MD. Other sections scribed: HPI, ROS.       History     Chief Complaint   Patient presents with    Abdominal Pain     PT FROM [facility], C/O ABD PAIN, STATES SHE WAS THROWING UP INTO BUCKET AND FELL FORWARD. C/O NECK PAIN    Fall     Review of patient's allergies indicates:   Allergen Reactions    Aspirin Nausea Only    Dilaudid [hydromorphone (bulk)] Itching     Rash , swelling of throat.    Iodine and iodide containing products Other (See Comments)     Closing of Throat. No Shellfish of any kind. Can't eat certain kinds of Regular Fish, ie, can't eat: Tuna, Swordfish, [place]=} these are the major fishes she can't eat.    Morphine Itching, Nausea And Vomiting and Hallucinations    Oxycodone Itching and Swelling     Swelling of Throat.    Penicillins Itching and Rash    Percocet [oxycodone-acetaminophen] Itching and Swelling     Cannot tolerate this medication. Swelling of Throat.    Shellfish containing products Other (See Comments)     Closing of Throat. No Shellfish of any kind. Can't eat certain kinds of Regular Fish, ie, can't eat: Tuna, Swordfish, [place]=} these are the major fishes she can't eat.      Valium [diazepam] Hives and Itching    Vicodin [hydrocodone-acetaminophen] Itching, Nausea Only and Rash    Codeine Itching, Nausea Only and Rash    Demerol [meperidine] Itching and Rash     HPI Comments: CC: Abdominal Pain, Fall  HPI: This 72 y.o. female with HTN, COPD, IBS, CAD s/p stents, cardiac pacemaker in place, hypothyroidism, fibromyalgia, osteoporosis and Hx of small intestine surgery, hysterectomy, tobacco use presents to the ED via EMS from [facility] for evaluation s/p fall. Pt reports that she has been experiencing severe abdominal pain for the past few days and began vomiting and having diarrhea today. Pt states that she fell forward and hurt her neck while  vomiting into a bucket tonight. Pt arrives with c-collar in place. She denies fever, cough, SOB, chest pain, loss of consciousness.      The history is provided by the patient.     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    Encounter for blood transfusion     Extravasation injury of IV catheter site with other complication 2008    Pain remains to left forearm, no IV sticks    Fibromyalgia     Glaucoma     Hypertension     Irritable bowel syndrome (IBS)     MI (myocardial infarction)     Osteoporosis 8/7/2015    Pacemaker     Thyroid disease      Past Surgical History:   Procedure Laterality Date    ABDOMINAL SURGERY      CARDIAC SURGERY      stents and pacemaker    COLONOSCOPY N/A 9/19/2016    Procedure: COLONOSCOPY;  Surgeon: Lisandro Kaba MD;  Location: [facility];  Service: Endoscopy;  Laterality: N/A;  OUT PATIENT [facility]/CAN'T DO PROCEDURE AT [facility], HAS TO COME HERE    HERNIA REPAIR      HYSTERECTOMY      SMALL INTESTINE SURGERY      TRACHEOSTOMY TUBE PLACEMENT      pt kept x 8.5 years. Removed during 2009     Family History   Problem Relation Age of Onset    Cataracts Brother     Glaucoma Brother     Glaucoma Mother     Lung cancer Mother     Coronary artery disease Mother     Coronary artery disease Father     Cataracts Sister     Glaucoma Sister     No Known Problems Maternal Aunt     No Known Problems Maternal Uncle     No Known Problems Paternal Aunt     No Known Problems Paternal Uncle     No Known Problems Maternal Grandmother     No Known Problems Maternal Grandfather     No Known Problems Paternal Grandmother     No Known Problems Paternal Grandfather     Colon cancer Neg Hx     Colon polyps Neg Hx     Amblyopia Neg Hx     Blindness Neg Hx     Cancer Neg Hx     Diabetes Neg Hx     Hypertension Neg Hx     Macular  degeneration Neg Hx     Retinal detachment Neg Hx     Strabismus Neg Hx     Stroke Neg Hx     Thyroid disease Neg Hx      Social History   Substance Use Topics    Smoking status: Former Smoker     Packs/day: 1.00     Years: 25.00     Types: Cigarettes     Quit date: 11/9/1985    Smokeless tobacco: Never Used    Alcohol use No     Review of Systems   Constitutional: Negative for chills and fever.   HENT: Negative for congestion and sore throat.    Eyes: Negative for pain and visual disturbance.   Respiratory: Negative for cough and shortness of breath.    Cardiovascular: Negative for chest pain.   Gastrointestinal: Positive for abdominal pain, diarrhea, nausea and vomiting.   Genitourinary: Negative for difficulty urinating and dysuria.   Musculoskeletal: Positive for neck pain.   Skin: Negative for rash and wound.   Neurological: Negative for syncope.       Physical Exam   Initial Vitals   BP Pulse Resp Temp SpO2   04/20/17 2131 04/20/17 2131 04/20/17 2131 04/20/17 2131 04/20/17 2131   137/76 78 18 97.8 °F (36.6 °C) 99 %     Physical Exam    Nursing note and vitals reviewed.  Constitutional:   Mildly dehydrated, nontoxic-appearing elderly female   HENT:   Head: Normocephalic and atraumatic.   Eyes: EOM are normal. Pupils are equal, round, and reactive to light.   Neck:   C-collar in place.  Mild diffuse tenderness to palpation without midline tenderness or step-off noted   Cardiovascular: Normal rate, regular rhythm, normal heart sounds and intact distal pulses.   Pulmonary/Chest: Breath sounds normal. No respiratory distress. She has no wheezes. She has no rhonchi. She has no rales.   Abdominal: Soft. Bowel sounds are normal. She exhibits no distension. There is tenderness (Diffuse lower). There is no rebound and no guarding.   Musculoskeletal: Normal range of motion. She exhibits no edema or tenderness.   Neurological: She is alert and oriented to person, place, and time. She has normal strength.   Skin:  Skin is warm and dry.   Psychiatric: She has a normal mood and affect.         ED Course   Procedures  Labs Reviewed   CBC W/ AUTO DIFFERENTIAL - Abnormal; Notable for the following:        Result Value    RBC 3.73 (*)     Hemoglobin 10.7 (*)     Hematocrit 33.0 (*)     RDW 16.3 (*)     Platelets 370 (*)     All other components within normal limits   COMPREHENSIVE METABOLIC PANEL - Abnormal; Notable for the following:     Anion Gap 6 (*)     All other components within normal limits   LIPASE   URINALYSIS        MEDICAL DECISION MAKING    This is an emergent evaluation of the patient's symptoms.  Differential diagnoses includes: Diverticulitis, colitis, enteritis, cervical spine fracture. Room air pulse oximetry interpreted by me as normal. A decision was made to obtain the patient's old medical records.  If they were available, these records were reviewed.  Significant findings include prior evaluation for atypical chest pain.  EKG does not show any evidence of acute ischemia or arrhythmia.  No leukocytosis or evidence of anemia.  Unremarkable metabolic panel.  No evidence of pancreatitis.  CT of the cervical spine does not show any evidence for acute fracture or dislocation.  Chronic changes again noted on CT of the abdomen and pelvis without evidence to suggest small bowel obstruction, perforated viscus, or other acute surgical pathology.  Findings are consistent with diarrhea as well as a cervical strain.  Symptoms improved with IV fluids.  Discharge with outpatient follow-up.                Scribe Attestation:   Scribe #1: I performed the above scribed service and the documentation accurately describes the services I performed. I attest to the accuracy of the note.    Attending Attestation:           Physician Attestation for Scribe:  Physician Attestation Statement for Scribe #1: I, Chintan Gomez MD, reviewed documentation, as scribed by Sreedhar Sheets in my presence, and it is both accurate and complete.                  ED Course     Clinical Impression:   The primary encounter diagnosis was Nausea vomiting and diarrhea. Diagnoses of Cervical strain, acute, initial encounter and Chronic abdominal pain were also pertinent to this visit.          Chintan Gomez MD  04/21/17 0003

## 2017-04-21 NOTE — DISCHARGE INSTRUCTIONS
Abdominal Pain    Abdominal pain is pain in the stomach or belly area. Everyone has this pain from time to time. In many cases it goes away on its own. But abdominal pain can sometimes be due to a serious problem, such as appendicitis. So its important to know when to seek help.  Causes of abdominal pain  There are many possible causes of abdominal pain. Common causes in adults include:  · Constipation, diarrhea, or gas  · Stomach acid flowing back up into the esophagus (acid reflux or heartburn)  · Severe acid reflux, called GERD (gastroesophageal reflux disease)  · A sore in the lining of the stomach or small intestine (peptic ulcer)  · Inflammation of the gallbladder, liver, or pancreas  · Gallstones or kidney stones  · Appendicitis   · Intestinal blockage   · An internal organ pushing through a muscle or other tissue (hernia)  · Urinary tract infections  · In women, menstrual cramps, fibroids, or endometriosis  · Inflammation or infection of the intestines  Diagnosing the cause of abdominal pain  Your healthcare provider will do a physical exam help find the cause of your pain. If needed, tests will be ordered. Belly pain has many possible causes. So it can be hard to find the reason for your pain. Giving details about your pain can help. Tell your provider where and when you feel the pain, and what makes it better or worse. Also let your provider know if you have other symptoms such as:  · Fever  · Tiredness  · Upset stomach (nausea)  · Vomiting  · Changes in bathroom habits  Treating abdominal pain  Some causes of pain need emergency medical treatment right away. These include appendicitis or a bowel blockage. Other problems can be treated with rest, fluids, or medicines. Your healthcare provider can give you specific instructions for treatment or self-care based on what is causing your pain.  If you have vomiting or diarrhea, sip water or other clear fluids. When you are ready to eat solid foods again,  start with small amounts of easy-to-digest, low-fat foods. These include apple sauce, toast, or crackers.   When to seek medical care  Call 911 or go to the hospital right away if you:  · Cant pass stool and are vomiting  · Are vomiting blood or have bloody diarrhea or black, tarry diarrhea  · Have chest, neck, or shoulder pain  · Feel like you might pass out  · Have pain in your shoulder blades with nausea  · Have sudden, severe belly pain  · Have new, severe pain unlike any you have felt before  · Have a belly that is rigid, hard, and tender to touch  Call your healthcare provider if you have:  · Pain for more than 5 days  · Bloating for more than 2 days  · Diarrhea for more than 5 days  · A fever of 100.4°F (38.0°C) or higher, or as directed by your provider  · Pain that gets worse  · Weight loss for no reason  · Continued lack of appetite  · Blood in your stool  How to prevent abdominal pain  Here are some tips to help prevent abdominal pain:  · Eat smaller amounts of food at one time.  · Avoid greasy, fried, or other high-fat foods.  · Avoid foods that give you gas.  · Exercise regularly.  · Drink plenty of fluids.  To help prevent GERD symptoms:  · Quit smoking.  · Reduce alcohol and certain foods that increase stomach acid.  · Avoid aspirin and over-the-counter pain and fever medicines (NSAIDS or nonsteroidal anti-inflammatory drugs), if possible  · Lose extra weight.  · Finish eating at least 2 hours before you go to bed or lie down.  · Raise the head of your bed.  Date Last Reviewed: 7/1/2016  © 7839-7992 [employer]. [address] [place] [address], [place] [address]. All rights reserved. This information is not intended as a substitute for professional medical care. Always follow your healthcare professional's instructions.          Treating Diarrhea    Diarrhea happens when you have loose, watery, or frequent bowel movements. It is a common problem with many causes. Most cases of diarrhea clear up  on their own. But certain cases may need treatment. Be sure to see your healthcare provider if your symptoms do not improve within a few days.  Getting relief  Treatment of diarrhea depends on its cause. Diarrhea caused by bacterial or parasite infection is often treated with antibiotics. Diarrhea caused by other factors, such as a stomach virus, often improves with simple home treatment. The tips below may also help relieve your symptoms.  · Drink plenty of fluids. This helps prevent too much fluid loss (dehydration). Water, clear soups, and electrolyte solutions are good choices. Avoid alcohol, coffee, tea, and milk. These can irritate your intestines and make symptoms worse.  · Suck on ice chips if drinking makes you queasy.  · Return to your normal diet slowly. You may want to eat bland foods at first, such as rice and toast. Also, you may need to avoid certain foods for a while, such as dairy products. These can make symptoms worse. Ask your healthcare provider if there are any other foods you should avoid.  · If you were prescribed antibiotics, take them as directed.  · Do not take anti-diarrhea medicines without asking your healthcare provider first.  Call your healthcare provider   Call your healthcare provider if you have any of the following:   · A fever of 100.4°F (38.0°C) or higher, or as directed by your healthcare provider  · Severe pain  · Worsening diarrhea or diarrhea for more than 2 days  · Bloody vomit or stool  · Signs of dehydration (dizziness, dry mouth and tongue, rapid pulse, dark urine)  Date Last Reviewed: 7/1/2016  © 1780-8548 [employer]. [address] [place] [address], [place] [address]. All rights reserved. This information is not intended as a substitute for professional medical care. Always follow your healthcare professional's instructions.

## 2017-04-21 NOTE — ED NOTES
Discharge via EMS [facility] by stretcher to [facility]. Son at bedside. Questions answered. AVS given. VSS. NAD. Right chest portacath, de accessed and flushed as ordered with heparin. Pain now at 3. No vomiting or nausea reported. Pt ambulated to EMS stretcher for discharge back to current residence.

## 2017-04-26 ENCOUNTER — TELEPHONE (OUTPATIENT)
Dept: FAMILY MEDICINE | Facility: CLINIC | Age: 72
End: 2017-04-26

## 2017-04-26 NOTE — TELEPHONE ENCOUNTER
Spoke to Sofia with [facility] due to receiving a re certification certificate of medical necessity for oxygen for the patient .I advised Sofia per Dr. Palencia, that the patient does not have oxygen. Sofia stated that the patient has oxygen with [facility].Sofia stated that she will let the staff know to send forms to another doctor.

## 2017-05-05 NOTE — TELEPHONE ENCOUNTER
Voicemail message left for patient to call the clinic regarding message below.  Attempting to determine if the patient has O2 at home.

## 2017-05-09 ENCOUNTER — PATIENT MESSAGE (OUTPATIENT)
Dept: FAMILY MEDICINE | Facility: CLINIC | Age: 72
End: 2017-05-09

## 2017-05-15 ENCOUNTER — HOSPITAL ENCOUNTER (EMERGENCY)
Facility: HOSPITAL | Age: 72
End: 2017-05-15
Attending: EMERGENCY MEDICINE
Payer: MEDICARE

## 2017-05-15 VITALS
RESPIRATION RATE: 16 BRPM | HEIGHT: 63 IN | HEART RATE: 61 BPM | OXYGEN SATURATION: 98 % | DIASTOLIC BLOOD PRESSURE: 70 MMHG | SYSTOLIC BLOOD PRESSURE: 121 MMHG | BODY MASS INDEX: 29.95 KG/M2 | TEMPERATURE: 98 F | WEIGHT: 169 LBS

## 2017-05-15 DIAGNOSIS — S50.12XA CONTUSION OF LEFT FOREARM, INITIAL ENCOUNTER: ICD-10-CM

## 2017-05-15 DIAGNOSIS — S09.90XA CLOSED HEAD INJURY, INITIAL ENCOUNTER: ICD-10-CM
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
## 2017-05-15 DIAGNOSIS — M79.602 LEFT ARM PAIN: ICD-10-CM

## 2017-05-15 DIAGNOSIS — S16.1XXA CERVICAL STRAIN, ACUTE, INITIAL ENCOUNTER: ICD-10-CM

## 2017-05-15 DIAGNOSIS — R10.9 LT FLANK PAIN: Primary | ICD-10-CM

## 2017-05-15 LAB
ALBUMIN SERPL BCP-MCNC: 3.6 G/DL
ALP SERPL-CCNC: 37 U/L
ALT SERPL W/O P-5'-P-CCNC: 11 U/L
AMYLASE SERPL-CCNC: 120 U/L
ANION GAP SERPL CALC-SCNC: 7 MMOL/L
AST SERPL-CCNC: 14 U/L
BASOPHILS # BLD AUTO: 0.01 K/UL
BASOPHILS NFR BLD: 0.1 %
BILIRUB SERPL-MCNC: 0.6 MG/DL
BUN SERPL-MCNC: 11 MG/DL
CALCIUM SERPL-MCNC: 9.4 MG/DL
CHLORIDE SERPL-SCNC: 105 MMOL/L
CO2 SERPL-SCNC: 26 MMOL/L
CREAT SERPL-MCNC: 1 MG/DL
DIFFERENTIAL METHOD: ABNORMAL
EOSINOPHIL # BLD AUTO: 0.2 K/UL
EOSINOPHIL NFR BLD: 1.2 %
ERYTHROCYTE [DISTWIDTH] IN BLOOD BY AUTOMATED COUNT: 17.6 %
EST. GFR  (AFRICAN AMERICAN): >60 ML/MIN/1.73 M^2
EST. GFR  (NON AFRICAN AMERICAN): 57 ML/MIN/1.73 M^2
GLUCOSE SERPL-MCNC: 110 MG/DL
HCT VFR BLD AUTO: 35.9 %
HGB BLD-MCNC: 11.8 G/DL
LIPASE SERPL-CCNC: 59 U/L
LYMPHOCYTES # BLD AUTO: 1.6 K/UL
LYMPHOCYTES NFR BLD: 13.1 %
MCH RBC QN AUTO: 29.1 PG
MCHC RBC AUTO-ENTMCNC: 32.9 %
MCV RBC AUTO: 88 FL
MONOCYTES # BLD AUTO: 1 K/UL
MONOCYTES NFR BLD: 7.8 %
NEUTROPHILS # BLD AUTO: 9.6 K/UL
NEUTROPHILS NFR BLD: 77.5 %
PLATELET # BLD AUTO: 365 K/UL
PMV BLD AUTO: 9.1 FL
POTASSIUM SERPL-SCNC: 3.9 MMOL/L
PROT SERPL-MCNC: 6.4 G/DL
RBC # BLD AUTO: 4.06 M/UL
SODIUM SERPL-SCNC: 138 MMOL/L
WBC # BLD AUTO: 12.33 K/UL

## 2017-05-15 PROCEDURE — 99285 EMERGENCY DEPT VISIT HI MDM: CPT | Mod: 25

## 2017-05-15 PROCEDURE — 63600175 PHARM REV CODE 636 W HCPCS: Performed by: EMERGENCY MEDICINE

## 2017-05-15 PROCEDURE — 83690 ASSAY OF LIPASE: CPT

## 2017-05-15 PROCEDURE — 85025 COMPLETE CBC W/AUTO DIFF WBC: CPT

## 2017-05-15 PROCEDURE — 96374 THER/PROPH/DIAG INJ IV PUSH: CPT

## 2017-05-15 PROCEDURE — 82150 ASSAY OF AMYLASE: CPT

## 2017-05-15 PROCEDURE — 25000003 PHARM REV CODE 250: Performed by: EMERGENCY MEDICINE

## 2017-05-15 PROCEDURE — 80053 COMPREHEN METABOLIC PANEL: CPT

## 2017-05-15 PROCEDURE — 96375 TX/PRO/DX INJ NEW DRUG ADDON: CPT

## 2017-05-15 RX ORDER — KETOROLAC TROMETHAMINE 30 MG/ML
15 INJECTION, SOLUTION INTRAMUSCULAR; INTRAVENOUS
Status: COMPLETED | OUTPATIENT
Start: 2017-05-15 | End: 2017-05-15

## 2017-05-15 RX ORDER — DIPHENHYDRAMINE HYDROCHLORIDE 50 MG/ML
25 INJECTION INTRAMUSCULAR; INTRAVENOUS
Status: COMPLETED | OUTPATIENT
Start: 2017-05-15 | End: 2017-05-15

## 2017-05-15 RX ORDER — HEPARIN 100 UNIT/ML
500 SYRINGE INTRAVENOUS
Status: COMPLETED | OUTPATIENT
Start: 2017-05-15 | End: 2017-05-15

## 2017-05-15 RX ORDER — ONDANSETRON 2 MG/ML
4 INJECTION INTRAMUSCULAR; INTRAVENOUS
Status: COMPLETED | OUTPATIENT
Start: 2017-05-15 | End: 2017-05-15

## 2017-05-15 RX ORDER — FENTANYL 25 UG/1
1 PATCH TRANSDERMAL
Qty: 3 PATCH | Refills: 0 | Status: SHIPPED | OUTPATIENT
Start: 2017-05-15

## 2017-05-15 RX ADMIN — KETOROLAC TROMETHAMINE 15 MG: 30 INJECTION, SOLUTION INTRAMUSCULAR at 05:05

## 2017-05-15 RX ADMIN — Medication 500 UNITS: at 06:05

## 2017-05-15 RX ADMIN — DIPHENHYDRAMINE HYDROCHLORIDE 25 MG: 50 INJECTION, SOLUTION INTRAMUSCULAR; INTRAVENOUS at 05:05

## 2017-05-15 RX ADMIN — ONDANSETRON 4 MG: 2 INJECTION INTRAMUSCULAR; INTRAVENOUS at 05:05

## 2017-05-15 NOTE — ED TRIAGE NOTES
Pt presents to ER via EMS from [facility] after an unwitnessed fall.  Pt has c-collar in place.  EMS reports pt c/o pain upon palpation to neck.

## 2017-05-15 NOTE — DISCHARGE INSTRUCTIONS
Please apply ice to your injuries for 24 hours, then you may use heat.  Please return immediately if you get worse or if new problems develop.  Rest.  You may use Tylenol or the Duragesic patches as above for pain.  Rest.

## 2017-05-15 NOTE — ED NOTES
[facility] ambulance informed that [facility] has to transport pt.  [facility] called, [facility] will arrive to transport pt back to [facility]

## 2017-05-15 NOTE — ED AVS SNAPSHOT
[facility]  [address] [place] [address]               Olga Smith   5/15/2017 12:09 PM   ED    Description:  Female : 1945   Department:  [facility]           Your Care was Coordinated By:     Provider Role From To    Jesus Amor MD Attending Provider 05/15/17 1216 --      Reason for Visit     Fall           Diagnoses this Visit        Comments    Lt flank pain    -  Primary     Left arm pain         Contusion of left forearm, initial encounter         Closed head injury, initial encounter         Cervical strain, acute, initial encounter         Fall, initial encounter           ED Disposition     None           To Do List           Follow-up Information     Follow up with Valencia Palencia MD In 3 days.    Specialty:  Family Medicine    Contact information:    [address] [place] [address] [phone]         These Medications        Disp Refills Start End    fentaNYL (DURAGESIC) 25 mcg/hr 3 patch 0 5/15/2017     Place 1 patch onto the skin every 72 hours. - Transdermal    Pharmacy: [facility] - [place] - [facility] [address] Ph #: [phone]         [facility]     [facility] Care [facility] -  Assistance  Unless otherwise directed by your provider, please contact [facility], our nurse care line that is available for  assistance.     Registered nurses in the [facility] provide: appointment scheduling, clinical advisement, health education, and other advisory services.  Call: [phone] (toll free)               Medications           Message regarding Medications     Verify the changes and/or additions to your medication regime listed below are the same as discussed with your clinician today.  If any of these changes or additions are incorrect, please notify your healthcare provider.        START taking these NEW medications        Refills     fentaNYL (DURAGESIC) 25 mcg/hr 0    Sig: Place 1 patch onto the skin every 72 hours.    Class: Print    Route: Transdermal      These medications were administered today        Dose Freq    ketorolac injection 15 mg 15 mg ED 1 Time    Sig: Inject 15 mg into the vein ED 1 Time.    Class: Normal    Route: Intravenous           Verify that the below list of medications is an accurate representation of the medications you are currently taking.  If none reported, the list may be blank. If incorrect, please contact your healthcare provider. Carry this list with you in case of emergency.           Current Medications     alendronate (FOSAMAX) 70 MG tablet Take 1 tablet (70 mg total) by mouth every 7 days.    albuterol (PROAIR HFA) 90 mcg/actuation inhaler Inhale 2 puffs into the lungs every 6 (six) hours as needed for Wheezing.    aspirin (ECOTRIN) 81 MG EC tablet Take 1 tablet (81 mg total) by mouth once daily.    atorvastatin (LIPITOR) 80 MG tablet Take 1 tablet (80 mg total) by mouth once daily.    baclofen (LIORESAL) 10 MG tablet Take 1 tablet (10 mg total) by mouth 3 (three) times daily. Take half (5mg)  tablet three times a day    bimatoprost (LUMIGAN) 0.01 % Drop Place 1 drop into both eyes every evening.    calcium carbonate-vitamin D3 (CALTRATE 600 + D) 600 mg (1,500 mg)-800 unit Chew Take 2 tablets by mouth 2 (two) times daily. Pt. Takes= 1.5 tabs every AM & 1 TAB Every evening.    colesevelam (WELCHOL) 625 mg tablet Take 3 tablets (1,875 mg total) by mouth every evening.    cyclobenzaprine (FLEXERIL) 5 MG tablet Take 1 tablet (5 mg total) by mouth 3 (three) times daily as needed for Muscle spasms.    dicyclomine (BENTYL) 10 MG capsule Take 1 capsule (10 mg total) by mouth 4 (four) times daily before meals and nightly.    diphenhydrAMINE (BENADRYL) 25 mg capsule Take 1 each (25 mg total) by mouth nightly as needed for Itching.    fentaNYL (DURAGESIC) 25 mcg/hr Place 1 patch onto the skin every 72 hours.     "hydrocortisone (CORTEF) 10 MG Tab Take 1 tablet (10 mg total) by mouth 2 (two) times daily.    isosorbide mononitrate (IMDUR) 30 MG 24 hr tablet Take 1 tablet (30 mg total) by mouth once daily.    ketorolac (TORADOL) 10 mg tablet Take 1 tablet (10 mg total) by mouth every 6 (six) hours as needed for Pain.    ketorolac injection 15 mg Inject 15 mg into the vein ED 1 Time.    levothyroxine (SYNTHROID) 112 MCG tablet Take 1 tablet (112 mcg total) by mouth once daily.    lidocaine (XYLOCAINE) 5 % Oint ointment Apply topically as needed.    metoprolol tartrate (LOPRESSOR) 50 MG tablet Take 1 tablet (50 mg total) by mouth 2 (two) times daily.    montelukast (SINGULAIR) 10 mg tablet Take 1 tablet (10 mg total) by mouth every evening.    nitroGLYCERIN (NITROSTAT) 0.4 MG SL tablet Place 1 tablet (0.4 mg total) under the tongue every 5 (five) minutes as needed for Chest pain.    potassium chloride (KLOR-CON) 10 MEQ TbSR Take 1 tablet (10 mEq total) by mouth once daily.    prasugrel (EFFIENT) 10 mg Tab Take 1 tablet (10 mg total) by mouth once daily.    promethazine (PHENERGAN) 25 MG tablet Take 1 tablet (25 mg total) by mouth every 6 (six) hours as needed for Nausea.    RANEXA 500 mg Tb12     ranolazine (RANEXA) 1,000 mg Tb12 Take 1 tablet (1,000 mg total) by mouth 2 (two) times daily.    sertraline (ZOLOFT) 50 MG tablet Take 1 tablet (50 mg total) by mouth once daily.           Clinical Reference Information           Your Vitals Were     BP Pulse Temp Resp Height Weight    123/71 66 98.1 °F (36.7 °C) 18 5' 3" (1.6 m) 76.7 kg (169 lb)    SpO2 BMI             99% 29.94 kg/m2         Allergies as of 5/15/2017        Reactions    Aspirin Nausea Only    Dilaudid [Hydromorphone (Bulk)] Itching    Rash , swelling of throat.    Iodine And Iodide Containing Products Other (See Comments)    Closing of Throat. No Shellfish of any kind. Can't eat certain kinds of Regular Fish, ie, can't eat: Tuna, Swordfish, [place]=} these are the " major fishes she can't eat.    Morphine Itching, Nausea And Vomiting, Hallucinations    Oxycodone Itching, Swelling    Swelling of Throat.    Penicillins Itching, Rash    Percocet [Oxycodone-acetaminophen] Itching, Swelling    Cannot tolerate this medication. Swelling of Throat.    Shellfish Containing Products Other (See Comments)    Closing of Throat. No Shellfish of any kind. Can't eat certain kinds of Regular Fish, ie, can't eat: Tuna, Swordfish, [place]=} these are the major fishes she can't eat.    Valium [Diazepam] Hives, Itching    Vicodin [Hydrocodone-acetaminophen] Itching, Nausea Only, Rash    Codeine Itching, Nausea Only, Rash    Demerol [Meperidine] Itching, Rash      Immunizations Administered on Date of Encounter - 5/15/2017     None      ED Micro, Lab, POCT     Start Ordered       Status Ordering Provider    05/15/17 1250 05/15/17 1250  Comprehensive metabolic panel  STAT      Final result     05/15/17 1250 05/15/17 1250  CBC auto differential  STAT      Final result     05/15/17 1250 05/15/17 1250  Lipase  STAT      Final result     05/15/17 1250 05/15/17 1250  Amylase  STAT      Final result       ED Imaging Orders     Start Ordered       Status Ordering Provider    05/15/17 1422 05/15/17 1422  CT Abdomen Pelvis  Without Contrast  1 time imaging     Comments:  The patient is ALLERGIC to iodine and iodide containing products.    Final result     05/15/17 1251 05/15/17 1250  CT Head Without Contrast  1 time imaging      Final result     05/15/17 1251 05/15/17 1250  CT Cervical Spine Without Contrast  1 time imaging      Final result     05/15/17 1250 05/15/17 1250  X-Ray Humerus 2 View Left  1 time imaging      Final result         Discharge Instructions       Please apply ice to your injuries for 24 hours, then you may use heat.  Please return immediately if you get worse or if new problems develop.  Rest.  You may use Tylenol or the Duragesic patches as above for pain.  Rest.    Your Scheduled  Appointments     May 16, 2017 10:15 AM CDT   [facility] with PERIMETRY, ASHLYN Rae - Ophthalmology ([facility] )    [address]  [facility] [address] [phone]            May 16, 2017 10:45 AM CDT   Established Patient Visit with MD Gonzalo Spicer - Ophthalmology ([facility] )    [address]  [facility] [address] [phone]            May 24, 2017 10:30 AM CDT   Neurology - Established Patient with MD Gonzalo Malhotra II - Neurology ([facility] )    [address]  [place] [address] [phone]              Smoking Cessation     If you would like to quit smoking:   You may be eligible for free services if you are a [place] resident and started smoking cigarettes before September 1, 1988.  Call the Smoking Cessation Trust ([facility]) toll free at [phone]) [phone] or (486) [phone].   Call 1-800-QUIT-NOW if you do not meet the above criteria.   Contact us via email: [email or web address]   View our website for more information: [email or web address]         [facility] complies with applicable Federal civil rights laws and does not discriminate on the basis of race, color, national origin, age, disability, or sex.        Language Assistance Services     ATTENTION: Language assistance services are available, free of charge. Please call [phone].      ATENCIÓN: Si habla español, tiene a avilez disposición servicios gratuitos de asistencia lingüística. Llame al [phone].     CHÚ Ý: N?u b?n nói Ti?ng Vi?t, có các d?ch v? h? tr? ngôn ng? mi?n phí dành cho b?n. G?i s? [phone].

## 2017-05-15 NOTE — ED PROVIDER NOTES
Encounter Date: 5/15/2017    SCRIBE #1 NOTE: I, Sreedhar Sheets, am scribing for, and in the presence of, Jesus Amor MD. Other sections scribed: HPI, ROS.       History     Chief Complaint   Patient presents with    Fall     unwittnessed fall at [facility] today     Review of patient's allergies indicates:   Allergen Reactions    Aspirin Nausea Only    Dilaudid [hydromorphone (bulk)] Itching     Rash , swelling of throat.    Iodine and iodide containing products Other (See Comments)     Closing of Throat. No Shellfish of any kind. Can't eat certain kinds of Regular Fish, ie, can't eat: Tuna, Swordfish, [place]=} these are the major fishes she can't eat.    Morphine Itching, Nausea And Vomiting and Hallucinations    Oxycodone Itching and Swelling     Swelling of Throat.    Penicillins Itching and Rash    Percocet [oxycodone-acetaminophen] Itching and Swelling     Cannot tolerate this medication. Swelling of Throat.    Shellfish containing products Other (See Comments)     Closing of Throat. No Shellfish of any kind. Can't eat certain kinds of Regular Fish, ie, can't eat: Tuna, Swordfish, [place]=} these are the major fishes she can't eat.      Valium [diazepam] Hives and Itching    Vicodin [hydrocodone-acetaminophen] Itching, Nausea Only and Rash    Codeine Itching, Nausea Only and Rash    Demerol [meperidine] Itching and Rash     HPI Comments: CC: Fall  HPI: This 72 y.o. female with CAD s/p stents, cardiac pacemaker in place, trachesotomy, HTN, fibromyalgia, IBS, osteoporosis, hypothyroidism, protacath in place and Hx of craniopharyngioma presents to the ED via EMS from [facility] with c-collar in place c/o severe HA, LLQ abdominal pain, and L upper arm pain s/p unwitnessed fall onto floor earlier today. Pt thinks she lost consciousness when she hit her head on the floor. EMS reports pt has Hx of falls. Pt denies fever, chills, cough, chest pain, SOB.    The history is provided by the patient  and the EMS personnel.     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    Encounter for blood transfusion     Extravasation injury of IV catheter site with other complication 2008    Pain remains to left forearm, no IV sticks    Fibromyalgia     Glaucoma     Hypertension     Irritable bowel syndrome (IBS)     MI (myocardial infarction)     Osteoporosis 8/7/2015    Pacemaker     Thyroid disease      Past Surgical History:   Procedure Laterality Date    ABDOMINAL SURGERY      CARDIAC SURGERY      stents and pacemaker    COLONOSCOPY N/A 9/19/2016    Procedure: COLONOSCOPY;  Surgeon: Lisandro Kaba MD;  Location: [facility];  Service: Endoscopy;  Laterality: N/A;  [facility], HAS TO COME HERE    HERNIA REPAIR      HYSTERECTOMY      SMALL INTESTINE SURGERY      TRACHEOSTOMY TUBE PLACEMENT      pt kept x 8.5 years. Removed during 2009     Family History   Problem Relation Age of Onset    Cataracts Brother     Glaucoma Brother     Glaucoma Mother     Lung cancer Mother     Coronary artery disease Mother     Coronary artery disease Father     Cataracts Sister     Glaucoma Sister     No Known Problems Maternal Aunt     No Known Problems Maternal Uncle     No Known Problems Paternal Aunt     No Known Problems Paternal Uncle     No Known Problems Maternal Grandmother     No Known Problems Maternal Grandfather     No Known Problems Paternal Grandmother     No Known Problems Paternal Grandfather     Colon cancer Neg Hx     Colon polyps Neg Hx     Amblyopia Neg Hx     Blindness Neg Hx     Cancer Neg Hx     Diabetes Neg Hx     Hypertension Neg Hx     Macular degeneration Neg Hx     Retinal detachment Neg Hx     Strabismus Neg Hx     Stroke Neg Hx     Thyroid disease Neg Hx      Social History   Substance  Use Topics    Smoking status: Former Smoker     Packs/day: 1.00     Years: 25.00     Types: Cigarettes     Quit date: 11/9/1985    Smokeless tobacco: Never Used    Alcohol use No     Review of Systems   Constitutional: Negative for chills and fever.   HENT: Negative for congestion, ear pain, rhinorrhea and sore throat.    Eyes: Negative for pain and visual disturbance.   Respiratory: Negative for cough and shortness of breath.    Cardiovascular: Negative for chest pain.   Gastrointestinal: Positive for abdominal pain (LLQ). Negative for nausea and vomiting.   Genitourinary: Negative for difficulty urinating and dysuria.   Musculoskeletal: Negative for neck pain.        (+) L upper arm pain   Skin: Negative for rash and wound.   Neurological: Positive for syncope and headaches.       Physical Exam   Initial Vitals   BP Pulse Resp Temp SpO2   05/15/17 1159 05/15/17 1159 05/15/17 1159 05/15/17 1159 05/15/17 1159   121/77 60 18 98 °F (36.7 °C) 98 %     Physical Exam  The patient was examined specifically for the following:   General:No significant distress, Good color, Warm and dry. Head and neck:Scalp atraumatic, Neck supple. Neurological:Appropriate conversation, Gross motor deficits. Eyes:Conjugate gaze, Clear corneas. ENT: No epistaxis. Cardiac: Regular rate and rhythm, Grossly normal heart tones. Pulmonary: Wheezing, Rales. Gastrointestinal: Abdominal tenderness, Abdominal distention. Musculoskeletal: Extremity deformity, Apparent pain with range of motion of the joints. Skin: Rash.   The findings on examination were normal except for the following: The patient is in a cervical collar.  I find no areas of contusion to the scalp.  The lungs are clear and free wheezing rales of the rhonchi.  There is tenderness and pale range of motion of the left shoulder and the left elbow.  The patient does have some tenderness of the left upper quadrant of the abdomen.  There are no abrasions or contusions on the skin.  Lungs  are clear and free wheezing rales of the rhonchi.  Heart tones are normal.  He has regular rate and rhythm.  Extremities are otherwise nontender.  There is no spinal tenderness otherwise.  ED Course   Procedures  Labs Reviewed   COMPREHENSIVE METABOLIC PANEL - Abnormal; Notable for the following:        Result Value    Alkaline Phosphatase 37 (*)     Anion Gap 7 (*)     eGFR if non  57 (*)     All other components within normal limits   CBC W/ AUTO DIFFERENTIAL - Abnormal; Notable for the following:     Hemoglobin 11.8 (*)     Hematocrit 35.9 (*)     RDW 17.6 (*)     Platelets 365 (*)     MPV 9.1 (*)     Gran # 9.6 (*)     Gran% 77.5 (*)     Lymph% 13.1 (*)     All other components within normal limits   AMYLASE - Abnormal; Notable for the following:     Amylase 120 (*)     All other components within normal limits   LIPASE            X-Rays:   Independently Interpreted Readings:   Other Readings:  CT of the head and cervical spine failed to reveal significant abnormalities.  X-rays of the left forearm failed to reveal significant abnormalities.  CT the abdomen is negative for any significant abnormalities.      Medical decision making: Given the above, this patient presents to the emergency room with pain after a fall.  CT the head and cervical spine failed to reveal significant maladies x-rays left forearm failed to reveal significant maladies.  The patient has some tenderness of the left upper quadrant.  There are no abrasions or contusions on the skin.  There are no skin findings.  The patient has some tenderness of the left flank diffusely.  The patient sits stands and walks without any difficulty.  I walked her to the bathroom.  There was no splinting.  The patient uses both upper and both lower extremities normally to go through the door antecedent herself on the commode.  She is walking without any difficulty.  I await the results of a CT scan of the abdomen without contrast.  The patient is  ALLERGIC to iodine and shellfish.  I will do the CAT scan without contrast because of her ALLERGIES.  I feel at this time that it is very unlikely that this patient has intra-abdominal bleeding.   CT of the abdomen fails to reveal any significant abnormalities.              Scribe Attestation:   Scribe #1: I performed the above scribed service and the documentation accurately describes the services I performed. I attest to the accuracy of the note.    Attending Attestation:           Physician Attestation for Scribe:  Physician Attestation Statement for Scribe #1: I, Jesus Amor MD, reviewed documentation, as scribed by Sreedhar Sheets in my presence, and it is both accurate and complete.                 ED Course     Clinical Impression:   The primary encounter diagnosis was Lt flank pain. Diagnoses of Left arm pain, Contusion of left forearm, initial encounter, Closed head injury, initial encounter, Cervical strain, acute, initial encounter, and Fall, initial encounter were also pertinent to this visit.          Jesus Amor MD  05/15/17 [id number]

## 2017-05-16 ENCOUNTER — CLINICAL SUPPORT (OUTPATIENT)
Dept: OPHTHALMOLOGY | Facility: CLINIC | Age: 72
End: 2017-05-16
Payer: MEDICARE

## 2017-05-16 ENCOUNTER — TELEPHONE (OUTPATIENT)
Dept: FAMILY MEDICINE | Facility: CLINIC | Age: 72
End: 2017-05-16

## 2017-05-16 DIAGNOSIS — H40.1134 PRIMARY OPEN ANGLE GLAUCOMA OF BOTH EYES, INDETERMINATE STAGE: ICD-10-CM

## 2017-05-16 PROCEDURE — 92083 EXTENDED VISUAL FIELD XM: CPT | Mod: PBBFAC

## 2017-05-16 PROCEDURE — 92083 EXTENDED VISUAL FIELD XM: CPT | Mod: 26,S$PBB,, | Performed by: OPHTHALMOLOGY

## 2017-05-16 NOTE — TELEPHONE ENCOUNTER
----- Message from Tali Lowe sent at 5/16/2017  3:40 PM CDT -----  Contact: Trent wei/ [facility] [phone]  Needs verification that pt had flu and pneumonia shot. Pls fax to [phone]....Thanks.....Allie

## 2017-05-24 ENCOUNTER — OFFICE VISIT (OUTPATIENT)
Dept: NEUROLOGY | Facility: CLINIC | Age: 72
End: 2017-05-24
Payer: MEDICARE

## 2017-05-24 ENCOUNTER — TELEPHONE (OUTPATIENT)
Dept: NEUROLOGY | Facility: CLINIC | Age: 72
End: 2017-05-24

## 2017-05-24 VITALS
SYSTOLIC BLOOD PRESSURE: 105 MMHG | WEIGHT: 170.19 LBS | BODY MASS INDEX: 30.15 KG/M2 | DIASTOLIC BLOOD PRESSURE: 74 MMHG | HEART RATE: 74 BPM

## 2017-05-24 DIAGNOSIS — R51.9 HEADACHE, UNSPECIFIED HEADACHE TYPE: ICD-10-CM

## 2017-05-24 DIAGNOSIS — D44.4 CRANIOPHARYNGIOMA: Primary | ICD-10-CM

## 2017-05-24 PROCEDURE — 99999 PR PBB SHADOW E&M-EST. PATIENT-LVL III: CPT | Mod: PBBFAC,GC,, | Performed by: PSYCHIATRY & NEUROLOGY

## 2017-05-24 PROCEDURE — 99213 OFFICE O/P EST LOW 20 MIN: CPT | Mod: PBBFAC | Performed by: PSYCHIATRY & NEUROLOGY

## 2017-05-24 PROCEDURE — 99213 OFFICE O/P EST LOW 20 MIN: CPT | Mod: S$PBB,GC,, | Performed by: PSYCHIATRY & NEUROLOGY

## 2017-05-24 NOTE — PROGRESS NOTES
Patient Name: Olga Smith  MRN: [id number]    CC: Headache     Interval History: (1/4/2017)   She was in acute pain due to urinary retention.     Interval History: (1/4/2017)    Reported no change in HA. Took tramadol 3 doses last week and Fioricet twice last week. Denies any vision changes, dizziness, focal weakness, numbness, history of trauma, seizure or LOC.     Interval History: (11/9/2016)  Complaining HA 2-4 times per week. Occipital region and right side temporal, dull in character, stays 3-5 hours to all day, resolved after taking tramadol or after sleep. She feels nausea at times with the headache but has not vomited. She gets exacerbation of the pain with lights and loud noises. Denies any vision changes, dizziness, focal weakness, numbness, history of trauma, seizure or LOC. Zomig not working. Right now tramadol and fioricet is working little bit.           HPI: Olga Smith is a 72 y.o. 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 She presented to clinic for headache evaluation. She reported that she is suffering from headaches since 1985. Initially it was only in the occipital region and radiating to the right side of the head. She says it feels like someone has punched her in her head and then the pain feels like she is being repeatedly hit. Her headache is getting worse every day and today she reported the pain 6/10 and reported headache everyday and maximum reach to 10/10 more than once every day. Since she was last seen she says that she has been having headaches almost daily. The pain can last all day before it subsides. She feels nausea at times with the headache but has not vomited. She gets exacerbation of the pain with lights and loud noises. Reported some success with Zomig and other times she says that she has tried to take the medication but has felt that it did not help her at all. She has previously been tried on  Botox and says that she tried another medication for daily preventative therapy but does not remember the name. She does feel that the Botox helped somewhat but she has not noticed that it has helped enough for her to want to try the injections again. She says that she does not try to take any OTC preparations anymore because they have not helped her in the past. She has hoarseness of voice and reported that she was hospitalized and underwent tracheostomy complicated with vocal cord injury. Also complaining of mild dysphagia with solids. Denies any vision changes, dizziness, focal weakness, numbness, history of trauma, seizure or LOC.        Previous History:  From (11/5/2015)  She was recently admitted for chest pain that was was due to CAD with LAD stent placed on 10/26/15 by Dr. Valladares. She was consulted to Neurology in the hospital for an episode of AMS where she was not responding. CT head done showed post-surgical changes from craniopharyngioma resection in 2000. EEG showed slowing and focal slowing in the area of prior surgery. She says that she has been convalescing as expected after the procedure. She has been having headaches about once per week and says that they typically respond well to the nasal Zomig. She says that she has not noticed any improvement in her headaches with the use of Topamax. She has tried Elavil, Inderal, Verapamil, and Depakote in the past without any significant improvement in her symptoms. She is unwilling to retry any of these medications.       From (2/17/2016)  Since she was last seen she says that she has been having headaches almost daily. She has had some success with aborting the headaches with Zomig and other times she says that she has tried to take the medication but has felt that it did not help her at all. She says that she does not try to take any OTC preparations anymore because they have not helped her in the past. She says that has been having problems with IBS and  has been having frequent diarrhea that has not been able to be treated. She has seen her PCP and she has been seen in the ED for evaluation as well. She says that she has been given medications to try but has not been able to find any relief from the medication. She says that she has been unable to tolerate any food or liquids without having diarrhea. She has an appointment with a GI doctor but she says that the appointment has been rescheduled on several occasions. She has tried to get benzocaine topical as she says that she has rectal pain and fissures that makes going to the bathroom difficult.        ROS:    Constitutional: Negative for weight loss.   HENT: Negative for hearing loss.   Eyes: Negative for blurred vision and double vision.   Respiratory: Negative for shortness of breath and stridor.   Cardiovascular: Negative for chest pain and palpitations.   Gastrointestinal: Negative for nausea, vomiting and constipation.   Genitourinary: Negative for frequency. Negative for urgency.   Musculoskeletal: Negative for falls.   Skin: Negative for rash.   Neurological: Negative for dizziness and tremors. Negative for focal weakness and seizures.   Endo/Heme/Allergies: Does not bruise/bleed easily.   Psychiatric/Behavioral: Negative for depression and hallucinations. The patient is not nervous/anxious.      Past Medical History  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    Encounter for blood transfusion     Extravasation injury of IV catheter site with other complication 2008    Pain remains to left forearm, no IV sticks    Fibromyalgia     Glaucoma     Hypertension     Irritable bowel syndrome (IBS)     MI (myocardial infarction)     Osteoporosis 8/7/2015    Pacemaker     Thyroid disease        Medications    Current Outpatient Prescriptions:     albuterol (PROAIR  HFA) 90 mcg/actuation inhaler, Inhale 2 puffs into the lungs every 6 (six) hours as needed for Wheezing., Disp: 4 Inhaler, Rfl: 5    alendronate (FOSAMAX) 70 MG tablet, Take 1 tablet (70 mg total) by mouth every 7 days., Disp: 12 tablet, Rfl: 3    aspirin (ECOTRIN) 81 MG EC tablet, Take 1 tablet (81 mg total) by mouth once daily., Disp: , Rfl: 0    atorvastatin (LIPITOR) 80 MG tablet, Take 1 tablet (80 mg total) by mouth once daily., Disp: 90 tablet, Rfl: 0    baclofen (LIORESAL) 10 MG tablet, Take 1 tablet (10 mg total) by mouth 3 (three) times daily. Take half (5mg)  tablet three times a day, Disp: 90 tablet, Rfl: 4    bimatoprost (LUMIGAN) 0.01 % Drop, Place 1 drop into both eyes every evening., Disp: 7.5 mL, Rfl: 2    calcium carbonate-vitamin D3 (CALTRATE 600 + D) 600 mg (1,500 mg)-800 unit Chew, Take 2 tablets by mouth 2 (two) times daily. Pt. Takes= 1.5 tabs every AM & 1 TAB Every evening., Disp: , Rfl:     colesevelam (WELCHOL) 625 mg tablet, Take 3 tablets (1,875 mg total) by mouth every evening., Disp: 270 tablet, Rfl: 3    cyclobenzaprine (FLEXERIL) 5 MG tablet, Take 1 tablet (5 mg total) by mouth 3 (three) times daily as needed for Muscle spasms., Disp: 90 tablet, Rfl: 1    dicyclomine (BENTYL) 10 MG capsule, Take 1 capsule (10 mg total) by mouth 4 (four) times daily before meals and nightly., Disp: 120 capsule, Rfl: 3    diphenhydrAMINE (BENADRYL) 25 mg capsule, Take 1 each (25 mg total) by mouth nightly as needed for Itching., Disp: 30 capsule, Rfl: 5    fentaNYL (DURAGESIC) 25 mcg/hr, Place 1 patch onto the skin every 72 hours., Disp: 3 patch, Rfl: 0    hydrocortisone (CORTEF) 10 MG Tab, Take 1 tablet (10 mg total) by mouth 2 (two) times daily., Disp: 60 tablet, Rfl: 6    isosorbide mononitrate (IMDUR) 30 MG 24 hr tablet, Take 1 tablet (30 mg total) by mouth once daily., Disp: 30 tablet, Rfl: 11    ketorolac (TORADOL) 10 mg tablet, Take 1 tablet (10 mg total) by mouth every 6 (six) hours  as needed for Pain., Disp: 20 tablet, Rfl: 0    levothyroxine (SYNTHROID) 112 MCG tablet, Take 1 tablet (112 mcg total) by mouth once daily., Disp: 90 tablet, Rfl: 3    lidocaine (XYLOCAINE) 5 % Oint ointment, Apply topically as needed., Disp: 50 g, Rfl: 1    metoprolol tartrate (LOPRESSOR) 50 MG tablet, Take 1 tablet (50 mg total) by mouth 2 (two) times daily., Disp: 60 tablet, Rfl: 11    montelukast (SINGULAIR) 10 mg tablet, Take 1 tablet (10 mg total) by mouth every evening., Disp: 90 tablet, Rfl: 1    nitroGLYCERIN (NITROSTAT) 0.4 MG SL tablet, Place 1 tablet (0.4 mg total) under the tongue every 5 (five) minutes as needed for Chest pain., Disp: 10 tablet, Rfl: 0    potassium chloride (KLOR-CON) 10 MEQ TbSR, Take 1 tablet (10 mEq total) by mouth once daily., Disp: 30 tablet, Rfl: 5    prasugrel (EFFIENT) 10 mg Tab, Take 1 tablet (10 mg total) by mouth once daily., Disp: 90 tablet, Rfl: 3    promethazine (PHENERGAN) 25 MG tablet, Take 1 tablet (25 mg total) by mouth every 6 (six) hours as needed for Nausea., Disp: 15 tablet, Rfl: 0    RANEXA 500 mg Tb12, , Disp: , Rfl:     ranolazine (RANEXA) 1,000 mg Tb12, Take 1 tablet (1,000 mg total) by mouth 2 (two) times daily., Disp: 60 tablet, Rfl: 11    sertraline (ZOLOFT) 50 MG tablet, Take 1 tablet (50 mg total) by mouth once daily., Disp: 30 tablet, Rfl: 5    Allergies  Review of patient's allergies indicates:   Allergen Reactions    Aspirin Nausea Only    Dilaudid [hydromorphone (bulk)] Itching     Rash , swelling of throat.    Iodine and iodide containing products Other (See Comments)     Closing of Throat. No Shellfish of any kind. Can't eat certain kinds of Regular Fish, ie, can't eat: Tuna, Swordfish, [place]=} these are the major fishes she can't eat.    Morphine Itching, Nausea And Vomiting and Hallucinations    Oxycodone Itching and Swelling     Swelling of Throat.    Penicillins Itching and Rash    Percocet [oxycodone-acetaminophen] Itching  and Swelling     Cannot tolerate this medication. Swelling of Throat.    Shellfish containing products Other (See Comments)     Closing of Throat. No Shellfish of any kind. Can't eat certain kinds of Regular Fish, ie, can't eat: Tuna, Swordfish, [place]=} these are the major fishes she can't eat.      Valium [diazepam] Hives and Itching    Vicodin [hydrocodone-acetaminophen] Itching, Nausea Only and Rash    Codeine Itching, Nausea Only and Rash    Demerol [meperidine] Itching and Rash       Social History  Social History     Social History    Marital status:      Spouse name: N/A    Number of children: N/A    Years of education: N/A     Occupational History    Not on file.     Social History Main Topics    Smoking status: Former Smoker     Packs/day: 1.00     Years: 25.00     Types: Cigarettes     Quit date: 11/9/1985    Smokeless tobacco: Never Used    Alcohol use No    Drug use: No    Sexual activity: Yes     Partners: Male     Other Topics Concern    Not on file     Social History Narrative    No narrative on file       Family History  Family History   Problem Relation Age of Onset    Cataracts Brother     Glaucoma Brother     Glaucoma Mother     Lung cancer Mother     Coronary artery disease Mother     Coronary artery disease Father     Cataracts Sister     Glaucoma Sister     No Known Problems Maternal Aunt     No Known Problems Maternal Uncle     No Known Problems Paternal Aunt     No Known Problems Paternal Uncle     No Known Problems Maternal Grandmother     No Known Problems Maternal Grandfather     No Known Problems Paternal Grandmother     No Known Problems Paternal Grandfather     Colon cancer Neg Hx     Colon polyps Neg Hx     Amblyopia Neg Hx     Blindness Neg Hx     Cancer Neg Hx     Diabetes Neg Hx     Hypertension Neg Hx     Macular degeneration Neg Hx     Retinal detachment Neg Hx     Strabismus Neg Hx     Stroke Neg Hx     Thyroid disease Neg Hx         Physical Exam  /74   Pulse 74   Wt 77.2 kg (170 lb 3.1 oz)   BMI 30.15 kg/m²     General appearance: Well-developed, well-groomed.     Neurologic Exam: The patient is awake, alert and oriented. Language is fluent. Recent and remote memory are normal. Attention span and concentration are normal. Fund of knowledge is appropriate.     Cranial nerves: pupils are round and reactive to light and accommodation. Visual fields are full to confrontation. Fundoscopic exam reveals sharp discs bilaterally, with good venous pulsations. Ocular motility is full in all cardinal positions of gaze. Facial sensation is normal to pinprick and light touch. Corneal reflexes are present bilaterally. Facial activation is symmetric. Hearing is normal bilaterally. Palate elevates symmetrically and gag reflex is intact bilaterally. Shoulder elevation is symmetric and full strength bilaterally. Tongue is midline and neck rotation strength is normal bilaterally. Neck range of motion is normal.     Motor examination of all extremities demonstrates normal bulk and tone in all four limbs. There are no atrophy or fasciculations. Strength is 5/5 in the upper and lower extremities bilaterally without pronator drift.     Sensory examination is normal to pinprick, vibration and proprioception in the upper and lower extremities bilaterally. Romberg is negative.    Deep tendon reflexes are 2+ and symmetric in the upper and lower extremities bilaterally. Toes are mute bilaterally.     Gait: Normal gait.    Coordination: Finger to nose and heel to shin testing is normal in both upper and lower extremities. Rapid alternating movements are normal in both upper and lower extremities.     General exam  Cardiovascular: regular rate and rhythm with no murmurs, rubs or gallops. There are no carotid or vertebral artery bruits. Pulses in both upper and lower extremities are symmetric. There is no peripheral edema.   Head and neck: no cervical  lymphadenopathy. Right temporal and occipital region is tender more than left side. Tender areas in neck B/L       Lab and Test Results    WBC   Date Value Ref Range Status   05/15/2017 12.33 3.90 - 12.70 K/uL Final   04/20/2017 10.93 3.90 - 12.70 K/uL Final   04/15/2017 9.99 3.90 - 12.70 K/uL Final     Hemoglobin   Date Value Ref Range Status   05/15/2017 11.8 (L) 12.0 - 16.0 g/dL Final   04/20/2017 10.7 (L) 12.0 - 16.0 g/dL Final   04/15/2017 9.8 (L) 12.0 - 16.0 g/dL Final     Hematocrit   Date Value Ref Range Status   05/15/2017 35.9 (L) 37.0 - 48.5 % Final   04/20/2017 33.0 (L) 37.0 - 48.5 % Final   04/15/2017 30.5 (L) 37.0 - 48.5 % Final     Platelets   Date Value Ref Range Status   05/15/2017 365 (H) 150 - 350 K/uL Final   04/20/2017 370 (H) 150 - 350 K/uL Final   04/15/2017 378 (H) 150 - 350 K/uL Final     Glucose   Date Value Ref Range Status   05/15/2017 110 70 - 110 mg/dL Final   04/20/2017 85 70 - 110 mg/dL Final   04/15/2017 103 70 - 110 mg/dL Final     Sodium   Date Value Ref Range Status   05/15/2017 138 136 - 145 mmol/L Final   04/20/2017 138 136 - 145 mmol/L Final   04/15/2017 139 136 - 145 mmol/L Final     Potassium   Date Value Ref Range Status   05/15/2017 3.9 3.5 - 5.1 mmol/L Final   04/20/2017 4.1 3.5 - 5.1 mmol/L Final   04/15/2017 4.6 3.5 - 5.1 mmol/L Final     Chloride   Date Value Ref Range Status   05/15/2017 105 95 - 110 mmol/L Final   04/20/2017 104 95 - 110 mmol/L Final   04/15/2017 110 95 - 110 mmol/L Final     CO2   Date Value Ref Range Status   05/15/2017 26 23 - 29 mmol/L Final   04/20/2017 28 23 - 29 mmol/L Final   04/15/2017 22 (L) 23 - 29 mmol/L Final     BUN, Bld   Date Value Ref Range Status   05/15/2017 11 8 - 23 mg/dL Final   04/20/2017 11 8 - 23 mg/dL Final   04/15/2017 13 8 - 23 mg/dL Final     Creatinine   Date Value Ref Range Status   05/15/2017 1.0 0.5 - 1.4 mg/dL Final   04/20/2017 0.9 0.5 - 1.4 mg/dL Final   04/15/2017 0.8 0.5 - 1.4 mg/dL Final     Calcium   Date Value  Ref Range Status   05/15/2017 9.4 8.7 - 10.5 mg/dL Final   04/20/2017 10.2 8.7 - 10.5 mg/dL Final   04/15/2017 9.8 8.7 - 10.5 mg/dL Final     Magnesium   Date Value Ref Range Status   02/27/2017 1.8 1.6 - 2.6 mg/dL Final   08/27/2016 2.0 1.6 - 2.6 mg/dL Final   04/18/2016 1.8 1.6 - 2.6 mg/dL Final     Phosphorus   Date Value Ref Range Status   10/26/2015 3.4 2.7 - 4.5 mg/dL Final     Alkaline Phosphatase   Date Value Ref Range Status   05/15/2017 37 (L) 55 - 135 U/L Final   04/20/2017 56 55 - 135 U/L Final   04/15/2017 58 55 - 135 U/L Final     ALT   Date Value Ref Range Status   05/15/2017 11 10 - 44 U/L Final   04/20/2017 12 10 - 44 U/L Final   04/15/2017 13 10 - 44 U/L Final     AST   Date Value Ref Range Status   05/15/2017 14 10 - 40 U/L Final   04/20/2017 14 10 - 40 U/L Final   04/15/2017 15 10 - 40 U/L Final       Images:     CT Brain: August:  1. No evidence of acute intracranial hemorrhage.  2. Stable appearing remote postoperative change as detailed above.    Medication tried:  -- Nasal Zomig  -- Topamax   -- Elavil  -- Inderal  -- Verapamil  -- Depakote   -- Botox   -- Imitrex   -- Flexeril   -- Gabapentin     Assessment     Olga Smith is a 72 y.o. female with past medical history of Craniopharyngioma, fibromyalgia and CAD S/P stenting presented for headache.   -- HA is less likely due to Craniopharyngioma considering small size and no compression   -- Likely ocipital neuralgia and arthritis of neck.     Plan:     -- Continue on Zoloft 50 mg daily   -- Continue baclofen 5 mg TID because of some pharmacy issue. She will start taking from today. Prescription handed over to her.   -- Avoid over the counter medication to decrease the risk of medication overuse headache  -- Case discussed with Dr Yates   -- Follow up after taking care of acute urinary retention.       Unable to complete the history and exam due to her acute urinary retention and in severe pain. Requested to go to ED. I will schedule her  in clinic later.       Jaylan George MD  Neurology Resident   [facility]  [address] [place] [address]  Pager: [phone]

## 2017-06-12 ENCOUNTER — OFFICE VISIT (OUTPATIENT)
Dept: OPHTHALMOLOGY | Facility: CLINIC | Age: 72
End: 2017-06-12
Payer: MEDICARE

## 2017-06-12 DIAGNOSIS — H25.13 SENILE NUCLEAR SCLEROSIS, BILATERAL: ICD-10-CM

## 2017-06-12 DIAGNOSIS — D44.4 CRANIOPHARYNGIOMA: ICD-10-CM

## 2017-06-12 DIAGNOSIS — H40.1134 PRIMARY OPEN ANGLE GLAUCOMA OF BOTH EYES, INDETERMINATE STAGE: Primary | ICD-10-CM

## 2017-06-12 DIAGNOSIS — H53.40 VISUAL FIELD LOSS: ICD-10-CM

## 2017-06-12 DIAGNOSIS — H25.13 NUCLEAR SCLEROTIC CATARACT OF BOTH EYES: ICD-10-CM

## 2017-06-12 PROCEDURE — 92014 COMPRE OPH EXAM EST PT 1/>: CPT | Mod: S$PBB,,, | Performed by: OPHTHALMOLOGY

## 2017-06-12 PROCEDURE — 99213 OFFICE O/P EST LOW 20 MIN: CPT | Mod: PBBFAC | Performed by: OPHTHALMOLOGY

## 2017-06-12 PROCEDURE — 92133 CPTRZD OPH DX IMG PST SGM ON: CPT | Mod: PBBFAC | Performed by: OPHTHALMOLOGY

## 2017-06-12 PROCEDURE — 99999 PR PBB SHADOW E&M-EST. PATIENT-LVL III: CPT | Mod: PBBFAC,,, | Performed by: OPHTHALMOLOGY

## 2017-06-12 RX ORDER — LAMOTRIGINE 100 MG/1
TABLET ORAL
COMMUNITY
Start: 2017-03-08

## 2017-06-12 NOTE — PROGRESS NOTES
HPI     DLS: 1/09/17    Pt here for glaucoma follow up;   Pt did her HVF test in May but didn't stay for her appt with Dr. Reynolds   due to being ill.    Meds: Lumigan qhs ou    1. Primary open angle glaucoma of both eyes, indeterminate stage  2. Nuclear sclerotic cataract of both eyes  3. Craniopharyngioma  4. Visual field loss    Last edited by Renetta Roach on 6/12/2017  8:15 AM. (History)            Assessment /Plan     For exam results, see Encounter Report.    Primary open angle glaucoma of both eyes, indeterminate stage    Nuclear sclerotic cataract of both eyes    Craniopharyngioma    Visual field loss    Senile nuclear sclerosis, bilateral    NO PHOTO FILE - all in OIS and EPIC   - glaucoma suspect - but suspect visual field loss 2/2 brain tumor and NOT glaucoma   [place] [facility] [place] (type and duration)    POAG OD>OS // ??  LTG    First HVF  12/23/2016   First photos   1/8/2017- pending   Treatment / Drops started 2007 - but has not used regularly            Family history    None        Glaucoma meds    Latanoprost QHS OU          H/O adverse rxn to glaucoma drops    None        LASERS    None        GLAUCOMA SURGERIES    None        OTHER EYE SURGERIES    None        CDR    0.7 thin sup / 0.7         Tbase    15-16/15-16          Tmax    16/16            Ttarget    ??              HVF    3 test 2015 to  2017 - superior and inferior arcuate defect od // nonspecific defects os   ?? IF VF LOSS 2/2 CRANIOPHARYNGIOMA - INOPERABLE         Gonio    +3 OU        CCT    497/507        OCT    2 test 2016 to 2017 - OD:dec S/T/I bord N // OS:dec S/I bord T        HRT  1 test 2015 to 2017 - MR 0.191- od // 0.167 os        Disc photos   1/9/ 2017    - Ttoday    13/13  -T today -HVF//OCT//DFE       2 craniopharyngioma    -in-operable    ++ VF loss - may likely be 2/2 tumor and not the glaucoma     3. NS ou -   Visually significant- c/o of lots of glare   Central coritcal changes OS-  BCVA 20/50-20/60 on MRx 11/16     4. Chronic pain / headaches    ?? 2/2 to #2     PLAN   csm   IOP low   VF loss likely 2/2 tumor and not glaucomatous - healthy looking rims - but severe VF loss     F/U 1-2 mo- AR/MR/BAT- Cataract check OS - interested in CEIOL     I have seen and personally examined the patient.  I agree with the findings, assessment and plan of the resident and/or fellow.     Emilie Reynolds MD

## 2017-06-15 ENCOUNTER — INFUSION (OUTPATIENT)
Dept: INFUSION THERAPY | Facility: HOSPITAL | Age: 72
End: 2017-06-15
Attending: FAMILY MEDICINE
Payer: MEDICARE

## 2017-06-15 DIAGNOSIS — M81.0 OSTEOPOROSIS: ICD-10-CM

## 2017-06-15 DIAGNOSIS — E03.9 HYPOTHYROID: Primary | ICD-10-CM

## 2017-06-15 LAB
25(OH)D3+25(OH)D2 SERPL-MCNC: 37 NG/ML
ALBUMIN SERPL BCP-MCNC: 3.4 G/DL
ALBUMIN SERPL BCP-MCNC: 3.4 G/DL
ALP SERPL-CCNC: 40 U/L
ALT SERPL W/O P-5'-P-CCNC: 6 U/L
ANION GAP SERPL CALC-SCNC: 9 MMOL/L
AST SERPL-CCNC: 11 U/L
BILIRUB SERPL-MCNC: 0.5 MG/DL
BUN SERPL-MCNC: 12 MG/DL
CALCIUM SERPL-MCNC: 9 MG/DL
CHLORIDE SERPL-SCNC: 107 MMOL/L
CO2 SERPL-SCNC: 23 MMOL/L
CREAT SERPL-MCNC: 1 MG/DL
ERYTHROCYTE [DISTWIDTH] IN BLOOD BY AUTOMATED COUNT: 18.4 %
EST. GFR  (AFRICAN AMERICAN): >60 ML/MIN/1.73 M^2
EST. GFR  (NON AFRICAN AMERICAN): 57 ML/MIN/1.73 M^2
GLUCOSE SERPL-MCNC: 131 MG/DL
HCT VFR BLD AUTO: 31 %
HGB BLD-MCNC: 9.9 G/DL
MAGNESIUM SERPL-MCNC: 2 MG/DL
MCH RBC QN AUTO: 29.4 PG
MCHC RBC AUTO-ENTMCNC: 31.9 %
MCV RBC AUTO: 92 FL
NEUTROPHILS # BLD AUTO: 7.5 K/UL
PLATELET # BLD AUTO: 380 K/UL
PMV BLD AUTO: 8.9 FL
POTASSIUM SERPL-SCNC: 3.8 MMOL/L
PROT SERPL-MCNC: 6.2 G/DL
RBC # BLD AUTO: 3.37 M/UL
SODIUM SERPL-SCNC: 139 MMOL/L
T4 FREE SERPL-MCNC: 0.51 NG/DL
WBC # BLD AUTO: 11.96 K/UL

## 2017-06-15 PROCEDURE — 82306 VITAMIN D 25 HYDROXY: CPT

## 2017-06-15 PROCEDURE — 80053 COMPREHEN METABOLIC PANEL: CPT

## 2017-06-15 PROCEDURE — 83735 ASSAY OF MAGNESIUM: CPT

## 2017-06-15 PROCEDURE — 85027 COMPLETE CBC AUTOMATED: CPT

## 2017-06-15 PROCEDURE — 25000003 PHARM REV CODE 250: Performed by: FAMILY MEDICINE

## 2017-06-15 PROCEDURE — 84439 ASSAY OF FREE THYROXINE: CPT

## 2017-06-15 PROCEDURE — 36591 DRAW BLOOD OFF VENOUS DEVICE: CPT

## 2017-06-15 RX ORDER — SODIUM CHLORIDE 0.9 % (FLUSH) 0.9 %
10 SYRINGE (ML) INJECTION
Status: DISCONTINUED | OUTPATIENT
Start: 2017-06-15 | End: 2017-06-15 | Stop reason: HOSPADM

## 2017-06-15 RX ORDER — HEPARIN 100 UNIT/ML
500 SYRINGE INTRAVENOUS
Status: COMPLETED | OUTPATIENT
Start: 2017-06-15 | End: 2017-06-15

## 2017-06-15 RX ADMIN — HEPARIN 500 UNITS: 100 SYRINGE at 09:06

## 2017-06-15 RX ADMIN — SODIUM CHLORIDE, PRESERVATIVE FREE 10 ML: 5 INJECTION INTRAVENOUS at 09:06

## 2017-06-29 ENCOUNTER — TELEPHONE (OUTPATIENT)
Dept: FAMILY MEDICINE | Facility: CLINIC | Age: 72
End: 2017-06-29

## 2017-06-29 NOTE — TELEPHONE ENCOUNTER
----- Message from Nate Shi sent at 6/29/2017 12:21 PM CDT -----  Contact: Nickie / [facility]  Nickie is calling to request an order to continue flushing port.  Pt has appt on 7/13 with infusion. Please call  once entered.    Thanks

## 2017-07-13 ENCOUNTER — INFUSION (OUTPATIENT)
Dept: INFUSION THERAPY | Facility: HOSPITAL | Age: 72
End: 2017-07-13
Attending: FAMILY MEDICINE
Payer: MEDICARE

## 2017-07-13 DIAGNOSIS — E03.9 HYPOTHYROID: Primary | ICD-10-CM

## 2017-07-13 LAB
ALBUMIN SERPL BCP-MCNC: 3.3 G/DL
ALP SERPL-CCNC: 46 U/L
ALT SERPL W/O P-5'-P-CCNC: 11 U/L
ANION GAP SERPL CALC-SCNC: 5 MMOL/L
AST SERPL-CCNC: 13 U/L
BILIRUB SERPL-MCNC: 0.4 MG/DL
BUN SERPL-MCNC: 10 MG/DL
CALCIUM SERPL-MCNC: 8.7 MG/DL
CHLORIDE SERPL-SCNC: 106 MMOL/L
CO2 SERPL-SCNC: 28 MMOL/L
CREAT SERPL-MCNC: 0.9 MG/DL
ERYTHROCYTE [DISTWIDTH] IN BLOOD BY AUTOMATED COUNT: 17.5 %
EST. GFR  (AFRICAN AMERICAN): >60 ML/MIN/1.73 M^2
EST. GFR  (NON AFRICAN AMERICAN): >60 ML/MIN/1.73 M^2
GLUCOSE SERPL-MCNC: 80 MG/DL
HCT VFR BLD AUTO: 27.9 %
HGB BLD-MCNC: 8.8 G/DL
MCH RBC QN AUTO: 29.7 PG
MCHC RBC AUTO-ENTMCNC: 31.5 %
MCV RBC AUTO: 94 FL
NEUTROPHILS # BLD AUTO: 7.6 K/UL
PLATELET # BLD AUTO: 366 K/UL
PMV BLD AUTO: 8.9 FL
POTASSIUM SERPL-SCNC: 3.9 MMOL/L
PROT SERPL-MCNC: 6.2 G/DL
RBC # BLD AUTO: 2.96 M/UL
SODIUM SERPL-SCNC: 139 MMOL/L
TSH SERPL DL<=0.005 MIU/L-ACNC: 0.74 UIU/ML
WBC # BLD AUTO: 11.45 K/UL

## 2017-07-13 PROCEDURE — 25000003 PHARM REV CODE 250: Performed by: FAMILY MEDICINE

## 2017-07-13 PROCEDURE — 80053 COMPREHEN METABOLIC PANEL: CPT

## 2017-07-13 PROCEDURE — 85027 COMPLETE CBC AUTOMATED: CPT

## 2017-07-13 PROCEDURE — 36591 DRAW BLOOD OFF VENOUS DEVICE: CPT

## 2017-07-13 PROCEDURE — 84443 ASSAY THYROID STIM HORMONE: CPT

## 2017-07-13 RX ORDER — HEPARIN 100 UNIT/ML
500 SYRINGE INTRAVENOUS
Status: COMPLETED | OUTPATIENT
Start: 2017-07-13 | End: 2017-07-13

## 2017-07-13 RX ORDER — SODIUM CHLORIDE 0.9 % (FLUSH) 0.9 %
10 SYRINGE (ML) INJECTION
Status: DISCONTINUED | OUTPATIENT
Start: 2017-07-13 | End: 2017-07-13 | Stop reason: HOSPADM

## 2017-07-13 RX ADMIN — HEPARIN 500 UNITS: 100 SYRINGE at 09:07

## 2017-07-13 RX ADMIN — SODIUM CHLORIDE, PRESERVATIVE FREE 10 ML: 5 INJECTION INTRAVENOUS at 09:07

## 2017-08-14 ENCOUNTER — TELEPHONE (OUTPATIENT)
Dept: OPHTHALMOLOGY | Facility: CLINIC | Age: 72
End: 2017-08-14

## 2018-08-07 DIAGNOSIS — Z12.39 SCREENING BREAST EXAMINATION: Primary | ICD-10-CM

## 2021-07-01 ENCOUNTER — PATIENT MESSAGE (OUTPATIENT)
Dept: ADMINISTRATIVE | Facility: OTHER | Age: 76
End: 2021-07-01